# Patient Record
Sex: FEMALE | Race: WHITE | Employment: FULL TIME | URBAN - METROPOLITAN AREA
[De-identification: names, ages, dates, MRNs, and addresses within clinical notes are randomized per-mention and may not be internally consistent; named-entity substitution may affect disease eponyms.]

---

## 2017-03-31 ENCOUNTER — HOSPITAL ENCOUNTER (OUTPATIENT)
Dept: MAMMOGRAPHY | Age: 47
Discharge: HOME | End: 2017-03-31

## 2017-03-31 DIAGNOSIS — Z12.31 VISIT FOR SCREENING MAMMOGRAM: ICD-10-CM

## 2017-03-31 PROCEDURE — 77067 SCR MAMMO BI INCL CAD: CPT

## 2018-05-29 ENCOUNTER — HOSPITAL ENCOUNTER (OUTPATIENT)
Dept: MAMMOGRAPHY | Age: 48
Discharge: HOME | End: 2018-05-29
Payer: COMMERCIAL

## 2018-05-29 DIAGNOSIS — Z12.31 VISIT FOR SCREENING MAMMOGRAM: ICD-10-CM

## 2018-05-29 PROCEDURE — 77067 SCR MAMMO BI INCL CAD: CPT

## 2018-08-02 ENCOUNTER — HOSPITAL ENCOUNTER (EMERGENCY)
Age: 48
Discharge: HOME | End: 2018-08-03
Attending: EMERGENCY MEDICINE
Payer: COMMERCIAL

## 2018-08-02 ENCOUNTER — APPOINTMENT (OUTPATIENT)
Dept: CT IMAGING | Age: 48
End: 2018-08-02
Attending: EMERGENCY MEDICINE
Payer: COMMERCIAL

## 2018-08-02 VITALS
HEIGHT: 68 IN | RESPIRATION RATE: 15 BRPM | BODY MASS INDEX: 31.07 KG/M2 | WEIGHT: 205 LBS | TEMPERATURE: 98.2 F | SYSTOLIC BLOOD PRESSURE: 161 MMHG | OXYGEN SATURATION: 96 % | HEART RATE: 73 BPM | DIASTOLIC BLOOD PRESSURE: 82 MMHG

## 2018-08-02 DIAGNOSIS — G43.109 MIGRAINE WITH AURA AND WITHOUT STATUS MIGRAINOSUS, NOT INTRACTABLE: Primary | ICD-10-CM

## 2018-08-02 LAB
ALBUMIN SERPL-MCNC: 3.8 G/DL (ref 3.5–5)
ALBUMIN/GLOB SERPL: 1 {RATIO} (ref 1.1–2.2)
ALP SERPL-CCNC: 99 U/L (ref 45–117)
ALT SERPL-CCNC: 32 U/L (ref 12–78)
ANION GAP SERPL CALC-SCNC: 5 MMOL/L (ref 5–15)
AST SERPL-CCNC: 7 U/L (ref 15–37)
BASOPHILS # BLD: 0 K/UL (ref 0–0.1)
BASOPHILS NFR BLD: 0 % (ref 0–1)
BILIRUB SERPL-MCNC: 0.4 MG/DL (ref 0.2–1)
BUN SERPL-MCNC: 12 MG/DL (ref 6–20)
BUN/CREAT SERPL: 13 (ref 12–20)
CALCIUM SERPL-MCNC: 8.6 MG/DL (ref 8.5–10.1)
CHLORIDE SERPL-SCNC: 106 MMOL/L (ref 97–108)
CO2 SERPL-SCNC: 29 MMOL/L (ref 21–32)
CREAT SERPL-MCNC: 0.89 MG/DL (ref 0.55–1.02)
DIFFERENTIAL METHOD BLD: NORMAL
EOSINOPHIL # BLD: 0.2 K/UL (ref 0–0.4)
EOSINOPHIL NFR BLD: 2 % (ref 0–7)
ERYTHROCYTE [DISTWIDTH] IN BLOOD BY AUTOMATED COUNT: 12.7 % (ref 11.5–14.5)
GLOBULIN SER CALC-MCNC: 3.7 G/DL (ref 2–4)
GLUCOSE SERPL-MCNC: 105 MG/DL (ref 65–100)
HCT VFR BLD AUTO: 43.7 % (ref 35–47)
HGB BLD-MCNC: 14 G/DL (ref 11.5–16)
IMM GRANULOCYTES # BLD: 0 K/UL (ref 0–0.04)
IMM GRANULOCYTES NFR BLD AUTO: 0 % (ref 0–0.5)
LYMPHOCYTES # BLD: 2.9 K/UL (ref 0.8–3.5)
LYMPHOCYTES NFR BLD: 32 % (ref 12–49)
MCH RBC QN AUTO: 29.3 PG (ref 26–34)
MCHC RBC AUTO-ENTMCNC: 32 G/DL (ref 30–36.5)
MCV RBC AUTO: 91.4 FL (ref 80–99)
MONOCYTES # BLD: 0.5 K/UL (ref 0–1)
MONOCYTES NFR BLD: 6 % (ref 5–13)
NEUTS SEG # BLD: 5.4 K/UL (ref 1.8–8)
NEUTS SEG NFR BLD: 60 % (ref 32–75)
NRBC # BLD: 0 K/UL (ref 0–0.01)
NRBC BLD-RTO: 0 PER 100 WBC
PLATELET # BLD AUTO: 312 K/UL (ref 150–400)
PMV BLD AUTO: 10.2 FL (ref 8.9–12.9)
POTASSIUM SERPL-SCNC: 4 MMOL/L (ref 3.5–5.1)
PROT SERPL-MCNC: 7.5 G/DL (ref 6.4–8.2)
RBC # BLD AUTO: 4.78 M/UL (ref 3.8–5.2)
SODIUM SERPL-SCNC: 140 MMOL/L (ref 136–145)
WBC # BLD AUTO: 9.1 K/UL (ref 3.6–11)

## 2018-08-02 PROCEDURE — 74011250636 HC RX REV CODE- 250/636: Performed by: EMERGENCY MEDICINE

## 2018-08-02 PROCEDURE — 36415 COLL VENOUS BLD VENIPUNCTURE: CPT | Performed by: EMERGENCY MEDICINE

## 2018-08-02 PROCEDURE — 74011000250 HC RX REV CODE- 250: Performed by: EMERGENCY MEDICINE

## 2018-08-02 PROCEDURE — 85025 COMPLETE CBC W/AUTO DIFF WBC: CPT | Performed by: EMERGENCY MEDICINE

## 2018-08-02 PROCEDURE — 96375 TX/PRO/DX INJ NEW DRUG ADDON: CPT

## 2018-08-02 PROCEDURE — 70450 CT HEAD/BRAIN W/O DYE: CPT

## 2018-08-02 PROCEDURE — 99284 EMERGENCY DEPT VISIT MOD MDM: CPT

## 2018-08-02 PROCEDURE — 96374 THER/PROPH/DIAG INJ IV PUSH: CPT

## 2018-08-02 PROCEDURE — 80053 COMPREHEN METABOLIC PANEL: CPT | Performed by: EMERGENCY MEDICINE

## 2018-08-02 PROCEDURE — 93005 ELECTROCARDIOGRAM TRACING: CPT

## 2018-08-02 RX ORDER — DIPHENHYDRAMINE HYDROCHLORIDE 50 MG/ML
25 INJECTION, SOLUTION INTRAMUSCULAR; INTRAVENOUS
Status: COMPLETED | OUTPATIENT
Start: 2018-08-02 | End: 2018-08-02

## 2018-08-02 RX ORDER — METHYLPHENIDATE HYDROCHLORIDE 54 MG/1
54 TABLET ORAL AS NEEDED
COMMUNITY

## 2018-08-02 RX ADMIN — DIPHENHYDRAMINE HYDROCHLORIDE 25 MG: 50 INJECTION, SOLUTION INTRAMUSCULAR; INTRAVENOUS at 23:14

## 2018-08-02 RX ADMIN — SODIUM CHLORIDE 10 MG: 9 INJECTION INTRAMUSCULAR; INTRAVENOUS; SUBCUTANEOUS at 23:13

## 2018-08-03 LAB
ATRIAL RATE: 65 BPM
CALCULATED P AXIS, ECG09: 21 DEGREES
CALCULATED R AXIS, ECG10: 5 DEGREES
CALCULATED T AXIS, ECG11: 26 DEGREES
DIAGNOSIS, 93000: NORMAL
P-R INTERVAL, ECG05: 162 MS
Q-T INTERVAL, ECG07: 394 MS
QRS DURATION, ECG06: 72 MS
QTC CALCULATION (BEZET), ECG08: 409 MS
VENTRICULAR RATE, ECG03: 65 BPM

## 2018-08-03 NOTE — ED NOTES
MD reviewed discharge instructions and options with patient and patient verbalized understanding. RN reviewed discharge instructions using teachback method. Pt ambulated to exit without difficulty and in no signs of acute distress escorted by , and   will drive home. No complaints or needs expressed at this time. Patient was counseled on medications prescribed at discharge. VSS at time of discharge. Pt to call neurology clinic in the morning for follow up.

## 2018-08-03 NOTE — ED PROVIDER NOTES
HPI Comments: 50 y.o. female with no significant past medical history who presents via private vehicle with chief complaint of headache. Pt c/o worsening HA for the past 3 days, accompanied by nausea and vomiting yesterday and photophobia onset today. Pt reports that the HA has been diffuse, but also reports a \"differernt sensation\" on the L side of her face onset today. Pain is a 7.5/10. Pt took motrin and sudafed without significant relief of sx. Pt denies injury or fall. Pt reports that she woke up with the HA 3 days ago. Pt denies visual disturbance, numbness, weakness. Pt also reports one episode of diarrhea 3 days ago, but it has since resolved. There are no other acute medical concerns at this time. Note written by Nate Spence, as dictated by Jemal Green DO 10:25 PM 
 
 
The history is provided by the patient. No  was used. History reviewed. No pertinent past medical history. History reviewed. No pertinent surgical history. History reviewed. No pertinent family history. Social History Social History  Marital status: UNKNOWN Spouse name: N/A  
 Number of children: N/A  
 Years of education: N/A Occupational History  Not on file. Social History Main Topics  Smoking status: Not on file  Smokeless tobacco: Not on file  Alcohol use Not on file  Drug use: Not on file  Sexual activity: Not on file Other Topics Concern  Not on file Social History Narrative ALLERGIES: Review of patient's allergies indicates no known allergies. Review of Systems Constitutional: Negative for activity change, appetite change, chills and fever. HENT: Negative for congestion, rhinorrhea, sinus pressure, sneezing and sore throat. Eyes: Positive for photophobia. Negative for visual disturbance. Respiratory: Negative for cough and shortness of breath. Cardiovascular: Negative for chest pain. Gastrointestinal: Positive for diarrhea (resolved), nausea and vomiting. Negative for abdominal pain, blood in stool and constipation. Genitourinary: Negative for difficulty urinating, dysuria, flank pain, frequency, hematuria, menstrual problem, urgency, vaginal bleeding and vaginal discharge. Musculoskeletal: Negative for arthralgias, back pain, myalgias and neck pain. Skin: Negative for rash and wound. Neurological: Positive for headaches. Negative for syncope, weakness and numbness. Psychiatric/Behavioral: Negative for self-injury and suicidal ideas. All other systems reviewed and are negative. Vitals:  
 08/02/18 2214 BP: 161/82 Pulse: 73 Resp: 15 Temp: 98.2 °F (36.8 °C) SpO2: 96% Weight: 93 kg (205 lb) Height: 5' 7.5\" (1.715 m) Physical Exam  
Constitutional: She is oriented to person, place, and time. She appears well-developed and well-nourished. No distress. Uncomfortable appearing. HENT:  
Head: Normocephalic and atraumatic. Eyes: Conjunctivae and EOM are normal. Pupils are equal, round, and reactive to light. No scleral icterus. Neck: Normal range of motion. Neck supple. No rigidity. No tracheal deviation present. Cardiovascular: Normal rate, regular rhythm, normal heart sounds and intact distal pulses. Exam reveals no gallop and no friction rub. No murmur heard. Pulmonary/Chest: Effort normal and breath sounds normal. She has no wheezes. She has no rales. Abdominal: Soft. She exhibits no distension. There is no tenderness. There is no rebound and no guarding. Musculoskeletal: She exhibits no edema. Neurological: She is alert and oriented to person, place, and time. She has normal strength. No cranial nerve deficit or sensory deficit. She displays a negative Romberg sign. Coordination and gait normal.  
Slight tingling sensation noted subjectively to L side of face. No dysarthria. No facial droop. 5/5 strength in all 4 extremities.    
Skin: Skin is warm and dry. No rash noted. She is not diaphoretic. Psychiatric: She has a normal mood and affect. Nursing note and vitals reviewed. Note written by Nate Baron, as dictated by Rudi Donis,  10:53 PM 
 
 
 
MDM 
50 y.o. female with remote hx of migraines presents with left sided HA with photophobia, N, V. Notes slight tingling sensation to her left face, otherwise neuro intact. Labs were drawn and returned reassuringly with no significant abnormalities. Ct head was done and was negative for any acute abnormalities. She was given a migraine cocktail and had significant improvement in her sx. Facial tingling resolved. Plan to d/c home. Given hx of migraines she was rec'd to f/u with neurology for further evaluation of her sx, especially if they persist or return more frequently. Return precautions were given for worsening or concerns. Discussed with pt and her  at the bedside and they stated both understanding and agreement. ED Course Procedures

## 2018-08-03 NOTE — DISCHARGE INSTRUCTIONS
Migraine Headache: Care Instructions  Your Care Instructions  Migraines are painful, throbbing headaches that often start on one side of the head. They may cause nausea and vomiting and make you sensitive to light, sound, or smell. Without treatment, migraines can last from 4 hours to a few days. Medicines can help prevent migraines or stop them after they have started. Your doctor can help you find which ones work best for you. Follow-up care is a key part of your treatment and safety. Be sure to make and go to all appointments, and call your doctor if you are having problems. It's also a good idea to know your test results and keep a list of the medicines you take. How can you care for yourself at home? · Do not drive if you have taken a prescription pain medicine. · Rest in a quiet, dark room until your headache is gone. Close your eyes, and try to relax or go to sleep. Don't watch TV or read. · Put a cold, moist cloth or cold pack on the painful area for 10 to 20 minutes at a time. Put a thin cloth between the cold pack and your skin. · Use a warm, moist towel or a heating pad set on low to relax tight shoulder and neck muscles. · Have someone gently massage your neck and shoulders. · Take your medicines exactly as prescribed. Call your doctor if you think you are having a problem with your medicine. You will get more details on the specific medicines your doctor prescribes. · Be careful not to take pain medicine more often than the instructions allow. You could get worse or more frequent headaches when the medicine wears off. To prevent migraines  · Keep a headache diary so you can figure out what triggers your headaches. Avoiding triggers may help you prevent headaches. Record when each headache began, how long it lasted, and what the pain was like.  (Was it throbbing, aching, stabbing, or dull?) Write down any other symptoms you had with the headache, such as nausea, flashing lights or dark spots, or sensitivity to bright light or loud noise. Note if the headache occurred near your period. List anything that might have triggered the headache. Triggers may include certain foods (chocolate, cheese, wine) or odors, smoke, bright light, stress, or lack of sleep. · If your doctor has prescribed medicine for your migraines, take it as directed. You may have medicine that you take only when you get a migraine and medicine that you take all the time to help prevent migraines. ¨ If your doctor has prescribed medicine for when you get a headache, take it at the first sign of a migraine, unless your doctor has given you other instructions. ¨ If your doctor has prescribed medicine to prevent migraines, take it exactly as prescribed. Call your doctor if you think you are having a problem with your medicine. · Find healthy ways to deal with stress. Migraines are most common during or right after stressful times. Take time to relax before and after you do something that has caused a migraine in the past.  · Try to keep your muscles relaxed by keeping good posture. Check your jaw, face, neck, and shoulder muscles for tension. Try to relax them. When you sit at a desk, change positions often. And make sure to stretch for 30 seconds each hour. · Get plenty of sleep and exercise. · Eat meals on a regular schedule. Avoid foods and drinks that often trigger migraines. These include chocolate, alcohol (especially red wine and port), aspartame, monosodium glutamate (MSG), and some additives found in foods (such as hot dogs, patterson, cold cuts, aged cheeses, and pickled foods). · Limit caffeine. Don't drink too much coffee, tea, or soda. But don't quit caffeine suddenly. That can also give you migraines. · Do not smoke or allow others to smoke around you. If you need help quitting, talk to your doctor about stop-smoking programs and medicines. These can increase your chances of quitting for good.   · If you are taking birth control pills or hormone therapy, talk to your doctor about whether they are triggering your migraines. When should you call for help? Call 911 anytime you think you may need emergency care. For example, call if:    · You have signs of a stroke. These may include:  ¨ Sudden numbness, paralysis, or weakness in your face, arm, or leg, especially on only one side of your body. ¨ Sudden vision changes. ¨ Sudden trouble speaking. ¨ Sudden confusion or trouble understanding simple statements. ¨ Sudden problems with walking or balance. ¨ A sudden, severe headache that is different from past headaches.    Call your doctor now or seek immediate medical care if:    · You have new or worse nausea and vomiting.     · You have a new or higher fever.     · Your headache gets much worse.    Watch closely for changes in your health, and be sure to contact your doctor if:    · You are not getting better after 2 days (48 hours). Where can you learn more? Go to http://ahsan-paola.info/. Enter I436 in the search box to learn more about \"Migraine Headache: Care Instructions. \"  Current as of: October 9, 2017  Content Version: 11.7  © 8000-7673 [employer]. Care instructions adapted under license by [employer] (which disclaims liability or warranty for this information). If you have questions about a medical condition or this instruction, always ask your healthcare professional. [address] any warranty or liability for your use of this information.

## 2018-08-03 NOTE — ED TRIAGE NOTES
Pt to ED for headache x 3 days. Pt states she's had headache in anterior head and posterior neck. +N/V. +lightheaded. +photophobia. States pain has evolved in locations and sensation over the past few days.

## 2018-11-28 ENCOUNTER — APPOINTMENT (OUTPATIENT)
Dept: CT IMAGING | Age: 48
End: 2018-11-28
Attending: EMERGENCY MEDICINE
Payer: COMMERCIAL

## 2018-11-28 ENCOUNTER — HOSPITAL ENCOUNTER (EMERGENCY)
Age: 48
Discharge: HOME | End: 2018-11-28
Attending: EMERGENCY MEDICINE
Payer: COMMERCIAL

## 2018-11-28 VITALS
OXYGEN SATURATION: 98 % | HEIGHT: 68 IN | SYSTOLIC BLOOD PRESSURE: 137 MMHG | TEMPERATURE: 98.4 F | RESPIRATION RATE: 16 BRPM | DIASTOLIC BLOOD PRESSURE: 92 MMHG | HEART RATE: 88 BPM | WEIGHT: 194 LBS | BODY MASS INDEX: 29.4 KG/M2

## 2018-11-28 DIAGNOSIS — M54.6 MIDLINE THORACIC BACK PAIN, UNSPECIFIED CHRONICITY: Primary | ICD-10-CM

## 2018-11-28 LAB
ANION GAP SERPL CALC-SCNC: 11 MMOL/L (ref 5–15)
BUN SERPL-MCNC: 19 MG/DL (ref 6–20)
BUN/CREAT SERPL: 19 (ref 12–20)
CALCIUM SERPL-MCNC: 9.1 MG/DL (ref 8.5–10.1)
CHLORIDE SERPL-SCNC: 100 MMOL/L (ref 97–108)
CO2 SERPL-SCNC: 27 MMOL/L (ref 21–32)
COMMENT, HOLDF: NORMAL
CREAT SERPL-MCNC: 0.99 MG/DL (ref 0.55–1.02)
D DIMER PPP FEU-MCNC: 0.41 MG/L FEU (ref 0–0.65)
GLUCOSE SERPL-MCNC: 95 MG/DL (ref 65–100)
POTASSIUM SERPL-SCNC: 3.5 MMOL/L (ref 3.5–5.1)
SAMPLES BEING HELD,HOLD: NORMAL
SODIUM SERPL-SCNC: 138 MMOL/L (ref 136–145)

## 2018-11-28 PROCEDURE — 85379 FIBRIN DEGRADATION QUANT: CPT

## 2018-11-28 PROCEDURE — 80048 BASIC METABOLIC PNL TOTAL CA: CPT

## 2018-11-28 PROCEDURE — 99282 EMERGENCY DEPT VISIT SF MDM: CPT

## 2018-11-28 PROCEDURE — 36415 COLL VENOUS BLD VENIPUNCTURE: CPT

## 2018-11-28 RX ORDER — SODIUM CHLORIDE 9 MG/ML
50 INJECTION, SOLUTION INTRAVENOUS ONCE
Status: DISCONTINUED | OUTPATIENT
Start: 2018-11-28 | End: 2018-11-28

## 2018-11-28 RX ORDER — SODIUM CHLORIDE 0.9 % (FLUSH) 0.9 %
10 SYRINGE (ML) INJECTION ONCE
Status: DISCONTINUED | OUTPATIENT
Start: 2018-11-28 | End: 2018-11-28

## 2018-11-28 RX ORDER — METHOCARBAMOL 750 MG/1
750 TABLET, FILM COATED ORAL 3 TIMES DAILY
Qty: 30 TAB | Refills: 0 | Status: SHIPPED | OUTPATIENT
Start: 2018-11-28

## 2018-11-28 NOTE — DISCHARGE INSTRUCTIONS
Back Pain: Care Instructions  Your Care Instructions    Back pain has many possible causes. It is often related to problems with muscles and ligaments of the back. It may also be related to problems with the nerves, discs, or bones of the back. Moving, lifting, standing, sitting, or sleeping in an awkward way can strain the back. Sometimes you don't notice the injury until later. Arthritis is another common cause of back pain. Although it may hurt a lot, back pain usually improves on its own within several weeks. Most people recover in 12 weeks or less. Using good home treatment and being careful not to stress your back can help you feel better sooner. Follow-up care is a key part of your treatment and safety. Be sure to make and go to all appointments, and call your doctor if you are having problems. It's also a good idea to know your test results and keep a list of the medicines you take. How can you care for yourself at home? · Sit or lie in positions that are most comfortable and reduce your pain. Try one of these positions when you lie down:  ? Lie on your back with your knees bent and supported by large pillows. ? Lie on the floor with your legs on the seat of a sofa or chair. ? Lie on your side with your knees and hips bent and a pillow between your legs. ? Lie on your stomach if it does not make pain worse. · Do not sit up in bed, and avoid soft couches and twisted positions. Bed rest can help relieve pain at first, but it delays healing. Avoid bed rest after the first day of back pain. · Change positions every 30 minutes. If you must sit for long periods of time, take breaks from sitting. Get up and walk around, or lie in a comfortable position. · Try using a heating pad on a low or medium setting for 15 to 20 minutes every 2 or 3 hours. Try a warm shower in place of one session with the heating pad. · You can also try an ice pack for 10 to 15 minutes every 2 to 3 hours.  Put a thin cloth between the ice pack and your skin. · Take pain medicines exactly as directed. ? If the doctor gave you a prescription medicine for pain, take it as prescribed. ? If you are not taking a prescription pain medicine, ask your doctor if you can take an over-the-counter medicine. · Take short walks several times a day. You can start with 5 to 10 minutes, 3 or 4 times a day, and work up to longer walks. Walk on level surfaces and avoid hills and stairs until your back is better. · Return to work and other activities as soon as you can. Continued rest without activity is usually not good for your back. · To prevent future back pain, do exercises to stretch and strengthen your back and stomach. Learn how to use good posture, safe lifting techniques, and proper body mechanics. When should you call for help? Call your doctor now or seek immediate medical care if:    · You have new or worsening numbness in your legs.     · You have new or worsening weakness in your legs. (This could make it hard to stand up.)     · You lose control of your bladder or bowels.    Watch closely for changes in your health, and be sure to contact your doctor if:    · You have a fever, lose weight, or don't feel well.     · You do not get better as expected. Where can you learn more? Go to http://ahsan-paola.info/. Enter D445 in the search box to learn more about \"Back Pain: Care Instructions. \"  Current as of: November 29, 2017  Content Version: 11.8  © 6389-2176 [employer]. Care instructions adapted under license by [employer] (which disclaims liability or warranty for this information). If you have questions about a medical condition or this instruction, always ask your healthcare professional. [place] [address] any warranty or liability for your use of this information.

## 2018-11-28 NOTE — ED TRIAGE NOTES
Triage Note: Patient arrives to ER complaining of neck pain that started approximately 1.5 weeks ago that radiates to her mid back and since 5 days ago radiates to her chest. Pt seen at [facility] and told to come to the ER for r/o blood clot d/t family hx of factor 5 deficiency.

## 2018-11-28 NOTE — ED PROVIDER NOTES
The history is provided by the patient. Neck Pain This is a new problem. The current episode started more than 1 week ago. The problem has been gradually worsening. The pain is associated with nothing. There has been no fever. Associated symptoms include chest pain. patient with neck and back pain (between shoulder blades) now with patient radiating to the mid-chest.  Sent from [facility] to r/o blood clot in her lung History reviewed. No pertinent past medical history. Past Surgical History:  
Procedure Laterality Date  HX CHOLECYSTECTOMY    
 2011 History reviewed. No pertinent family history. Social History Socioeconomic History  Marital status:  Spouse name: Not on file  Number of children: Not on file  Years of education: Not on file  Highest education level: Not on file Social Needs  Financial resource strain: Not on file  Food insecurity - worry: Not on file  Food insecurity - inability: Not on file  Transportation needs - medical: Not on file  Transportation needs - non-medical: Not on file Occupational History  Not on file Tobacco Use  Smoking status: Never Smoker  Smokeless tobacco: Never Used Substance and Sexual Activity  Alcohol use: Yes Comment: rarely  Drug use: No  
 Sexual activity: Not on file Other Topics Concern  Not on file Social History Narrative  Not on file ALLERGIES: Patient has no known allergies. Review of Systems Constitutional: Negative for chills and fever. Respiratory: Negative for chest tightness and shortness of breath. Cardiovascular: Positive for chest pain. Negative for palpitations and leg swelling. Gastrointestinal: Negative for abdominal pain, nausea and vomiting. Musculoskeletal: Positive for back pain and neck pain. All other systems reviewed and are negative. Vitals:  
 11/28/18 1627 BP: 147/89 Pulse: 86 Resp: 16  
 Temp: 99 °F (37.2 °C) SpO2: 98% Weight: 88 kg (194 lb 0.1 oz) Height: 5' 7.5\" (1.715 m) Physical Exam  
Constitutional: She is oriented to person, place, and time. She appears well-developed and well-nourished. HENT:  
Head: Normocephalic and atraumatic. Cardiovascular: Normal rate, regular rhythm and normal heart sounds. No murmur heard. Pulmonary/Chest: Effort normal and breath sounds normal. No respiratory distress. Musculoskeletal: She exhibits tenderness. She exhibits no edema or deformity. Paraspinal area between shoulders Neurological: She is alert and oriented to person, place, and time. Psychiatric: She has a normal mood and affect. Nursing note and vitals reviewed. MDM Number of Diagnoses or Management Options Midline thoracic back pain, unspecified chronicity:  
Diagnosis management comments: Patient with likely MSK pain. Patient with hx of factor 5 deficiency and sister with same and has had blood clots in her lungs. [facility] and sent her for CTA chest. 
EKG from [facility] - normal 
 
1720 - patient requesting d-dimer and if elevated will do the CTA chest, she has had a lot of imaging recently and if she can avoid it that would be her preference. 1750 - D-dimer negative, will d/c home with robaxin and refer to PCP for PT referral 
 
  
Amount and/or Complexity of Data Reviewed Clinical lab tests: ordered and reviewed Tests in the radiology section of CPT®: ordered and reviewed Procedures VITALS:  
Patient Vitals for the past 8 hrs: 
 Temp Pulse Resp BP SpO2  
11/28/18 1627 99 °F (37.2 °C) 86 16 147/89 98 % Recent Results (from the past 24 hour(s)) SAMPLES BEING HELD Collection Time: 11/28/18  4:29 PM  
Result Value Ref Range SAMPLES BEING HELD 1LAV,1PST,1BLU,1RED COMMENT   Add-on orders for these samples will be processed based on acceptable specimen integrity and analyte stability, which may vary by analyte. METABOLIC PANEL, BASIC Collection Time: 11/28/18  4:29 PM  
Result Value Ref Range Sodium 138 136 - 145 mmol/L Potassium 3.5 3.5 - 5.1 mmol/L Chloride 100 97 - 108 mmol/L  
 CO2 27 21 - 32 mmol/L Anion gap 11 5 - 15 mmol/L Glucose 95 65 - 100 mg/dL BUN 19 6 - 20 MG/DL Creatinine 0.99 0.55 - 1.02 MG/DL  
 BUN/Creatinine ratio 19 12 - 20 GFR est AA >60 >60 ml/min/1.73m2 GFR est non-AA 60 (L) >60 ml/min/1.73m2 Calcium 9.1 8.5 - 10.1 MG/DL  
D DIMER Collection Time: 11/28/18  4:29 PM  
Result Value Ref Range D-dimer 0.41 0.00 - 0.65 mg/L FEU No results found.

## 2020-12-11 ENCOUNTER — TRANSCRIBE ORDER (OUTPATIENT)
Dept: SCHEDULING | Age: 50
End: 2020-12-11

## 2020-12-11 DIAGNOSIS — Z12.31 OTHER SCREENING MAMMOGRAM: Primary | ICD-10-CM

## 2021-01-18 ENCOUNTER — HOSPITAL ENCOUNTER (OUTPATIENT)
Dept: MAMMOGRAPHY | Age: 51
Discharge: HOME | End: 2021-01-18
Payer: COMMERCIAL

## 2021-01-18 DIAGNOSIS — Z12.31 OTHER SCREENING MAMMOGRAM: ICD-10-CM

## 2021-01-18 PROCEDURE — 77067 SCR MAMMO BI INCL CAD: CPT | Performed by: INTERNAL MEDICINE

## 2021-01-18 PROCEDURE — 77030040922 HC BLNKT HYPOTHRM STRY -A

## 2021-01-18 PROCEDURE — 77030040361 HC SLV COMPR DVT MDII -B

## 2021-12-07 ENCOUNTER — HOSPITAL ENCOUNTER (INPATIENT)
Age: 51
LOS: 2 days | Discharge: HOME | DRG: 177 | End: 2021-12-10
Attending: EMERGENCY MEDICINE | Admitting: STUDENT IN AN ORGANIZED HEALTH CARE EDUCATION/TRAINING PROGRAM
Payer: COMMERCIAL

## 2021-12-07 DIAGNOSIS — J96.01 ACUTE RESPIRATORY FAILURE WITH HYPOXEMIA (HCC): ICD-10-CM

## 2021-12-07 DIAGNOSIS — U07.1 PNEUMONIA DUE TO COVID-19 VIRUS: Primary | ICD-10-CM

## 2021-12-07 DIAGNOSIS — J12.82 PNEUMONIA DUE TO COVID-19 VIRUS: Primary | ICD-10-CM

## 2021-12-07 LAB
ALBUMIN SERPL-MCNC: 2.9 G/DL (ref 3.5–5)
ALBUMIN/GLOB SERPL: 0.7 {RATIO} (ref 1.1–2.2)
ALP SERPL-CCNC: 74 U/L (ref 45–117)
ALT SERPL-CCNC: 83 U/L (ref 12–78)
ANION GAP SERPL CALC-SCNC: 8 MMOL/L (ref 5–15)
AST SERPL-CCNC: 73 U/L (ref 15–37)
BASOPHILS # BLD: 0 K/UL (ref 0–0.1)
BASOPHILS NFR BLD: 0 % (ref 0–1)
BILIRUB SERPL-MCNC: 0.5 MG/DL (ref 0.2–1)
BUN SERPL-MCNC: 8 MG/DL (ref 6–20)
BUN/CREAT SERPL: 10 (ref 12–20)
CALCIUM SERPL-MCNC: 8.6 MG/DL (ref 8.5–10.1)
CHLORIDE SERPL-SCNC: 103 MMOL/L (ref 97–108)
CO2 SERPL-SCNC: 26 MMOL/L (ref 21–32)
CREAT SERPL-MCNC: 0.77 MG/DL (ref 0.55–1.02)
DIFFERENTIAL METHOD BLD: ABNORMAL
EOSINOPHIL # BLD: 0 K/UL (ref 0–0.4)
EOSINOPHIL NFR BLD: 0 % (ref 0–7)
ERYTHROCYTE [DISTWIDTH] IN BLOOD BY AUTOMATED COUNT: 12.9 % (ref 11.5–14.5)
GLOBULIN SER CALC-MCNC: 4.3 G/DL (ref 2–4)
GLUCOSE SERPL-MCNC: 102 MG/DL (ref 65–100)
HCT VFR BLD AUTO: 36.9 % (ref 35–47)
HGB BLD-MCNC: 12.1 G/DL (ref 11.5–16)
IMM GRANULOCYTES # BLD AUTO: 0 K/UL (ref 0–0.04)
IMM GRANULOCYTES NFR BLD AUTO: 1 % (ref 0–0.5)
LYMPHOCYTES # BLD: 1.4 K/UL (ref 0.8–3.5)
LYMPHOCYTES NFR BLD: 26 % (ref 12–49)
MCH RBC QN AUTO: 29.3 PG (ref 26–34)
MCHC RBC AUTO-ENTMCNC: 32.8 G/DL (ref 30–36.5)
MCV RBC AUTO: 89.3 FL (ref 80–99)
MONOCYTES # BLD: 0.3 K/UL (ref 0–1)
MONOCYTES NFR BLD: 6 % (ref 5–13)
NEUTS SEG # BLD: 3.7 K/UL (ref 1.8–8)
NEUTS SEG NFR BLD: 67 % (ref 32–75)
NRBC # BLD: 0 K/UL (ref 0–0.01)
NRBC BLD-RTO: 0 PER 100 WBC
PLATELET # BLD AUTO: 311 K/UL (ref 150–400)
PMV BLD AUTO: 9.3 FL (ref 8.9–12.9)
POTASSIUM SERPL-SCNC: 3.7 MMOL/L (ref 3.5–5.1)
PROT SERPL-MCNC: 7.2 G/DL (ref 6.4–8.2)
RBC # BLD AUTO: 4.13 M/UL (ref 3.8–5.2)
SODIUM SERPL-SCNC: 137 MMOL/L (ref 136–145)
WBC # BLD AUTO: 5.5 K/UL (ref 3.6–11)

## 2021-12-07 PROCEDURE — 83880 ASSAY OF NATRIURETIC PEPTIDE: CPT

## 2021-12-07 PROCEDURE — 36415 COLL VENOUS BLD VENIPUNCTURE: CPT

## 2021-12-07 PROCEDURE — 85610 PROTHROMBIN TIME: CPT

## 2021-12-07 PROCEDURE — 74011250636 HC RX REV CODE- 250/636: Performed by: EMERGENCY MEDICINE

## 2021-12-07 PROCEDURE — 84145 PROCALCITONIN (PCT): CPT

## 2021-12-07 PROCEDURE — 84484 ASSAY OF TROPONIN QUANT: CPT

## 2021-12-07 PROCEDURE — 85730 THROMBOPLASTIN TIME PARTIAL: CPT

## 2021-12-07 PROCEDURE — 85025 COMPLETE CBC W/AUTO DIFF WBC: CPT

## 2021-12-07 PROCEDURE — 83735 ASSAY OF MAGNESIUM: CPT

## 2021-12-07 PROCEDURE — 93005 ELECTROCARDIOGRAM TRACING: CPT

## 2021-12-07 PROCEDURE — 99284 EMERGENCY DEPT VISIT MOD MDM: CPT

## 2021-12-07 PROCEDURE — 85379 FIBRIN DEGRADATION QUANT: CPT

## 2021-12-07 PROCEDURE — 85384 FIBRINOGEN ACTIVITY: CPT

## 2021-12-07 PROCEDURE — 80053 COMPREHEN METABOLIC PANEL: CPT

## 2021-12-07 PROCEDURE — 86140 C-REACTIVE PROTEIN: CPT

## 2021-12-07 PROCEDURE — 83615 LACTATE (LD) (LDH) ENZYME: CPT

## 2021-12-07 PROCEDURE — 82728 ASSAY OF FERRITIN: CPT

## 2021-12-07 RX ORDER — DEXAMETHASONE 4 MG/1
10 TABLET ORAL
Status: COMPLETED | OUTPATIENT
Start: 2021-12-07 | End: 2021-12-07

## 2021-12-07 RX ADMIN — DEXAMETHASONE 10 MG: 4 TABLET ORAL at 22:55

## 2021-12-08 ENCOUNTER — APPOINTMENT (OUTPATIENT)
Dept: GENERAL RADIOLOGY | Age: 51
DRG: 177 | End: 2021-12-08
Attending: EMERGENCY MEDICINE
Payer: COMMERCIAL

## 2021-12-08 PROBLEM — U07.1 COVID-19: Status: ACTIVE | Noted: 2021-12-08

## 2021-12-08 LAB
APTT PPP: 25.6 SEC (ref 22.1–31)
ATRIAL RATE: 79 BPM
BNP SERPL-MCNC: 140 PG/ML
CALCULATED P AXIS, ECG09: 43 DEGREES
CALCULATED R AXIS, ECG10: 35 DEGREES
CALCULATED T AXIS, ECG11: 12 DEGREES
COMMENT, HOLDF: NORMAL
CRP SERPL-MCNC: 6.08 MG/DL (ref 0–0.6)
D DIMER PPP FEU-MCNC: 1.62 MG/L FEU (ref 0–0.65)
DIAGNOSIS, 93000: NORMAL
FERRITIN SERPL-MCNC: 1015 NG/ML (ref 26–388)
FIBRINOGEN PPP-MCNC: 471 MG/DL (ref 200–475)
INR PPP: 0.9 (ref 0.9–1.1)
LACTATE SERPL-SCNC: 1.7 MMOL/L (ref 0.4–2)
LDH SERPL L TO P-CCNC: 679 U/L (ref 81–246)
MAGNESIUM SERPL-MCNC: 2.5 MG/DL (ref 1.6–2.4)
P-R INTERVAL, ECG05: 146 MS
PROCALCITONIN SERPL-MCNC: <0.05 NG/ML
PROTHROMBIN TIME: 9.8 SEC (ref 9–11.1)
Q-T INTERVAL, ECG07: 360 MS
QRS DURATION, ECG06: 70 MS
QTC CALCULATION (BEZET), ECG08: 412 MS
SAMPLES BEING HELD,HOLD: NORMAL
THERAPEUTIC RANGE,PTTT: NORMAL SECS (ref 58–77)
TROPONIN-HIGH SENSITIVITY: 8 NG/L (ref 0–51)
VENTRICULAR RATE, ECG03: 79 BPM

## 2021-12-08 PROCEDURE — G0378 HOSPITAL OBSERVATION PER HR: HCPCS

## 2021-12-08 PROCEDURE — 71045 X-RAY EXAM CHEST 1 VIEW: CPT

## 2021-12-08 PROCEDURE — 96374 THER/PROPH/DIAG INJ IV PUSH: CPT

## 2021-12-08 PROCEDURE — 96372 THER/PROPH/DIAG INJ SC/IM: CPT

## 2021-12-08 PROCEDURE — 65660000000 HC RM CCU STEPDOWN

## 2021-12-08 PROCEDURE — 83605 ASSAY OF LACTIC ACID: CPT

## 2021-12-08 PROCEDURE — 74011250637 HC RX REV CODE- 250/637: Performed by: HOSPITALIST

## 2021-12-08 PROCEDURE — 74011250636 HC RX REV CODE- 250/636: Performed by: HOSPITALIST

## 2021-12-08 RX ORDER — ZINC SULFATE 50(220)MG
1 CAPSULE ORAL
Status: DISCONTINUED | OUTPATIENT
Start: 2021-12-08 | End: 2021-12-10 | Stop reason: HOSPADM

## 2021-12-08 RX ORDER — DEXAMETHASONE SODIUM PHOSPHATE 4 MG/ML
6 INJECTION, SOLUTION INTRA-ARTICULAR; INTRALESIONAL; INTRAMUSCULAR; INTRAVENOUS; SOFT TISSUE EVERY 24 HOURS
Status: DISCONTINUED | OUTPATIENT
Start: 2021-12-08 | End: 2021-12-10 | Stop reason: HOSPADM

## 2021-12-08 RX ORDER — ENOXAPARIN SODIUM 100 MG/ML
40 INJECTION SUBCUTANEOUS EVERY 12 HOURS
Status: DISCONTINUED | OUTPATIENT
Start: 2021-12-08 | End: 2021-12-10 | Stop reason: HOSPADM

## 2021-12-08 RX ORDER — DOXYCYCLINE HYCLATE 100 MG
100 TABLET ORAL EVERY 12 HOURS
Status: DISCONTINUED | OUTPATIENT
Start: 2021-12-08 | End: 2021-12-09

## 2021-12-08 RX ORDER — ASCORBIC ACID 500 MG
500 TABLET ORAL 2 TIMES DAILY
Status: DISCONTINUED | OUTPATIENT
Start: 2021-12-08 | End: 2021-12-10 | Stop reason: HOSPADM

## 2021-12-08 RX ADMIN — OXYCODONE HYDROCHLORIDE AND ACETAMINOPHEN 500 MG: 500 TABLET ORAL at 18:07

## 2021-12-08 RX ADMIN — ENOXAPARIN SODIUM 40 MG: 100 INJECTION SUBCUTANEOUS at 18:07

## 2021-12-08 RX ADMIN — ZINC SULFATE 220 MG (50 MG) CAPSULE 1 CAPSULE: CAPSULE at 12:19

## 2021-12-08 RX ADMIN — DOXYCYCLINE HYCLATE 100 MG: 100 TABLET, COATED ORAL at 20:35

## 2021-12-08 RX ADMIN — DOXYCYCLINE HYCLATE 100 MG: 100 TABLET, COATED ORAL at 08:23

## 2021-12-08 RX ADMIN — DEXAMETHASONE SODIUM PHOSPHATE 6 MG: 4 INJECTION, SOLUTION INTRAMUSCULAR; INTRAVENOUS at 12:19

## 2021-12-08 RX ADMIN — ENOXAPARIN SODIUM 40 MG: 100 INJECTION SUBCUTANEOUS at 05:05

## 2021-12-08 RX ADMIN — OXYCODONE HYDROCHLORIDE AND ACETAMINOPHEN 500 MG: 500 TABLET ORAL at 08:23

## 2021-12-08 NOTE — PROGRESS NOTES
[id number] Adult  Hospitalist Group                                                                                          Hospitalist Progress Note  Tex Valladares MD  Answering service: [phone] from in house phone        Date of Service:  2021  NAME:  Michael Pizarro  :  1970  MRN:  [id number]      Admission Summary:   Michael Pizarro is a 46 y.o. female with past medical history of ADHD came to the hospital with a chief complaint of shortness of breath and low oxygen saturation.     Patient states that she was having symptoms of fever, chills body aches, shortness of breath, cough for about 12 days. Patient states that initially she tested negative but then was tested positive last week. Patient states that her  and children also had COVID-19. Patient states that she did not seen. She was seen at [facility] first yesterday where she was prescribed doxycycline. She was told to monitor her oxygen saturations-while at home she noted that oxygen saturation was less than 90%, she came to the emergency room for further evaluation.     In the emergency room, she received Decadron. Chest x-ray showed right lower lobe infiltrate    Interval history / Subjective:   Patient seen and examined on 3 L nasal cannula  No breathing difficulty     Assessment & Plan:     #Acute hypoxemic respiratory failure  #COVID-19 pneumonia  -Oxygen saturation on room air 89%, she was placed on 2 L oxygen.  -Start Decadron. Continue sliding scale insulin  -She states that she has symptoms for 12 days now-probably out of window for benefit of remdesivir. Can consider based on further clinical course. -She qualifies for barcitunib as CRP less than 7, I have provided information for the patient-start the medication if patient agrees.  -Procalcitonin within normal limits.   Patient said she took 2 doses of doxycycline since yesterday.  -Lovenox per protocol       #Mild AST/ALT elevation due to COVID-19. Trend CMP daily     #Morbid obesity     #ADHD-patient states that she takes Concerta as needed.      Code status: Full  DVT prophylaxis: Lovenox    Care Plan discussed with: Patient/Family and Nurse  Anticipated Disposition: Home w/Family  Anticipated Discharge: 24 hours to 48 hours     Hospital Problems  Never Reviewed          Codes Class Noted POA    COVID-19 ICD-10-CM: U07.1  ICD-9-CM: 079.89  12/8/2021 Unknown                Review of Systems:   Pertinent items are noted in HPI. Vital Signs:    Last 24hrs VS reviewed since prior progress note. Most recent are:  Visit Vitals  /81 (BP 1 Location: Left upper arm, BP Patient Position: Sitting)   Pulse 74   Temp 98.5 °F (36.9 °C)   Resp 17   Ht 5' 7\" (1.702 m)   Wt 103 kg (227 lb 1.2 oz)   SpO2 95%   BMI 35.56 kg/m²       No intake or output data in the 24 hours ending 12/08/21 1410     Physical Examination:     I had a face to face encounter with this patient and independently examined them on 12/8/2021 as outlined below:          Constitutional:  No acute distress, cooperative, pleasant    ENT:  Oral mucosa moist, oropharynx benign. Resp:  CTA bilaterally. No wheezing/rhonchi/rales. No accessory muscle use   CV:  Regular rhythm, normal rate, no murmurs, gallops, rubs    GI:  Soft, non distended, non tender. normoactive bowel sounds, no hepatosplenomegaly     Musculoskeletal:  No edema, warm, 2+ pulses throughout    Neurologic:  Moves all extremities.   AAOx3, CN II-XII reviewed            Data Review:    I personally reviewed  Image and labs      Labs:     Recent Labs     12/07/21 2032   WBC 5.5   HGB 12.1   HCT 36.9        Recent Labs     12/07/21 2032      K 3.7      CO2 26   BUN 8   CREA 0.77   *   CA 8.6   MG 2.5*     Recent Labs     12/07/21 2032   ALT 83*   AP 74   TBILI 0.5   TP 7.2   ALB 2.9*   GLOB 4.3*     Recent Labs     12/07/21 2032   INR 0.9   PTP 9.8   APTT 25.6      Recent Labs 12/07/21 2032   FERR 1,015*      No results found for: FOL, RBCF   No results for input(s): PH, PCO2, PO2 in the last 72 hours. No results for input(s): CPK, CKNDX, TROIQ in the last 72 hours. No lab exists for component: CPKMB  No results found for: CHOL, CHOLX, CHLST, CHOLV, HDL, HDLP, LDL, LDLC, DLDLP, TGLX, TRIGL, TRIGP, CHHD, CHHDX  No results found for: GLUCPOC  No results found for: COLOR, APPRN, SPGRU, REFSG, KOMAL, PROTU, GLUCU, KETU, BILU, UROU, SHAR, LEUKU, GLUKE, EPSU, BACTU, WBCU, RBCU, CASTS, UCRY      Medications Reviewed:     Current Facility-Administered Medications   Medication Dose Route Frequency    dexamethasone (DECADRON) 4 mg/mL injection 6 mg  6 mg IntraVENous Q24H    enoxaparin (LOVENOX) injection 40 mg  40 mg SubCUTAneous Q12H    . PHARMACY TO SUBSTITUTE PER PROTOCOL (Reordered from: methylphenidate ER 54 mg 24 hr tab)    Per Protocol    doxycycline (VIBRA-TABS) tablet 100 mg  100 mg Oral Q12H    ascorbic acid (vitamin C) (VITAMIN C) tablet 500 mg  500 mg Oral BID    zinc sulfate (ZINCATE) 50 mg zinc (220 mg) capsule 1 Capsule  1 Capsule Oral DAILY WITH LUNCH     ______________________________________________________________________  EXPECTED LENGTH OF STAY: - - -  ACTUAL LENGTH OF STAY:          0                 Nancie Houston MD

## 2021-12-08 NOTE — ROUTINE PROCESS
TRANSFER - OUT REPORT:    Verbal report given to [facility] (name) on [facility]  being transferred to (unit) for routine progression of care       Report consisted of patients Situation, Background, Assessment and   Recommendations(SBAR). Information from the following report(s) SBAR, ED Summary, Intake/Output, MAR and Recent Results was reviewed with the receiving nurse. Lines:   Peripheral IV 12/07/21 Left Antecubital (Active)   Site Assessment Clean, dry, & intact 12/07/21 2042   Phlebitis Assessment 0 12/07/21 2042   Infiltration Assessment 0 12/07/21 2042   Dressing Status Clean, dry, & intact 12/07/21 2042   Dressing Type Transparent 12/07/21 2042   Hub Color/Line Status Blue; Flushed; Patent 12/07/21 2042   Action Taken Blood drawn 12/07/21 2042        Opportunity for questions and clarification was provided.       Patient transported with:   O2 @ 3 liters  RN

## 2021-12-08 NOTE — ED NOTES
0217 95% on 3L (MD at bedside)  0218 Turned off 02  0223 89% on RA  0224 94% on 2.5L (MD turned back on Oxygen)

## 2021-12-08 NOTE — ED TRIAGE NOTES
Pt arrives from home c/o Covid. Pt reports she was dx on Friday. Pt reports O2 sats in the 80's at home. 93% on room air at rest.  88% when ambulatory. Pt placed on 2L nasal cannula in triage.

## 2021-12-08 NOTE — H&P
[address] [facility] Adult  Hospitalist Group  History and Physical    Primary Care Provider: Mena Harper  Date of Service:  12/8/2021    Chief Complaint: Shortness of breath    Subjective:     Kelby Perez is a 46 y.o. female with past medical history of ADHD came to the hospital with a chief complaint of shortness of breath and low oxygen saturation. Patient states that she was having symptoms of fever, chills body aches, shortness of breath, cough for about 12 days. Patient states that initially she tested negative but then was tested positive last week. Patient states that her  and children also had COVID-19. Patient states that she did not seen. [facility] where she was prescribed doxycycline. She was told to monitor her oxygen saturations-while at home she noted that oxygen saturation was less than 90%, she came to the emergency room for further evaluation. In the emergency room, she received Decadron. Chest x-ray showed right lower lobe infiltrate    Review of Systems:    A comprehensive review of systems was negative except for that written in the History of Present Illness. History reviewed. No pertinent past medical history. Past Surgical History:   Procedure Laterality Date    HX CHOLECYSTECTOMY      2011     Prior to Admission medications    Medication Sig Start Date End Date Taking? Authorizing Provider   methocarbamol (ROBAXIN) 750 mg tablet Take 1 Tab by mouth three (3) times daily. 11/28/18   Rande Osler, MD   methylphenidate ER 54 mg 24 hr tab Take 54 mg by mouth as needed. Other, MD Susana     No Known Allergies   History reviewed. No pertinent family history. SOCIAL HISTORY:  Patient resides at Home.   Patient ambulates independently  Smoking history: never  Alcohol history: Does not drink alcohol        Objective:       Physical Exam:   Gen:  Well-developed, well-nourished, in no acute distress  HEENT: anicteric, no pallor,hearing intact to voice, moist mucous membranes,sinus non tender  Neck:  Supple, without masses, thyroid non-tender  Resp:  No accessory muscle use, decreased breath sounds at bases without wheezes rales or rhonchi  Card:  No murmurs, normal S1, S2 without thrills, bruits or peripheral edema  Abd:  Soft, non-tender, non-distended, normoactive bowel sounds are present  Skin:  No rashes or ulcers, skin turgor is good  Neuro: Alert and oriented x3, muscle strength and sensation intact           ECG: Normal sinus rhythm with nonspecific T wave inversions in anterior leads    Data Review: All diagnostic labs and studies have been reviewed. XR CHEST PORT    Result Date: 12/8/2021  Right lower lobe consolidation. Assessment:     #Acute hypoxemic respiratory failure  #COVID-19 pneumonia  -Oxygen saturation on room air 89%, she was placed on 2 L oxygen.  -Start Decadron.  -She states that she has symptoms for 12 days now-probably out of window for benefit of remdesivir. Can consider based on further clinical course. -She qualifies for barcitunib as CRP less than 7, I have provided information for the patient-start the medication if patient agrees.  -Procalcitonin within normal limits. Patient said she took 2 doses of doxycycline since yesterday.  -Lovenox per protocol        #Mild AST/ALT elevation due to COVID-19. Trend CMP daily    #Morbid obesity    #ADHD-patient states that she takes Concerta as needed.       Signed By: Alexa Wilder MD     December 8, 2021

## 2021-12-08 NOTE — ED PROVIDER NOTES
The history is provided by the patient. Fatigue  This is a new problem. The current episode started more than 1 week ago (was diagnosed with COVID 4 days ago and has had ongoing fatigue and hypoxia at home on room air. close to 2 weeks of symptoms total. unvaccinated. ). The problem has been gradually worsening. There was no focality noted. Pertinent negatives include no focal weakness and no mental status change. There has been no fever. Pertinent negatives include no shortness of breath, no vomiting, no altered mental status and no confusion. There were no medications administered prior to arrival.        History reviewed. No pertinent past medical history. Past Surgical History:   Procedure Laterality Date    HX CHOLECYSTECTOMY      2011         History reviewed. No pertinent family history. Social History     Socioeconomic History    Marital status:      Spouse name: Not on file    Number of children: Not on file    Years of education: Not on file    Highest education level: Not on file   Occupational History    Not on file   Tobacco Use    Smoking status: Never Smoker    Smokeless tobacco: Never Used   Substance and Sexual Activity    Alcohol use: Yes     Comment: rarely    Drug use: No    Sexual activity: Not on file   Other Topics Concern    Not on file   Social History Narrative    Not on file     Social Determinants of Health     Financial Resource Strain:     Difficulty of Paying Living Expenses: Not on file   Food Insecurity:     Worried About Running Out of Food in the Last Year: Not on file    Dianne of Food in the Last Year: Not on file   Transportation Needs:     Lack of Transportation (Medical): Not on file    Lack of Transportation (Non-Medical):  Not on file   Physical Activity:     Days of Exercise per Week: Not on file    Minutes of Exercise per Session: Not on file   Stress:     Feeling of Stress : Not on file   Social Connections:     Frequency of Communication with Friends and Family: Not on file    Frequency of Social Gatherings with Friends and Family: Not on file    Attends Taoism Services: Not on file    Active Member of Clubs or Organizations: Not on file    Attends Club or Organization Meetings: Not on file    Marital Status: Not on file   Intimate Partner Violence:     Fear of Current or Ex-Partner: Not on file    Emotionally Abused: Not on file    Physically Abused: Not on file    Sexually Abused: Not on file   Housing Stability:     Unable to Pay for Housing in the Last Year: Not on file    Number of [place] in the Last Year: Not on file    Unstable Housing in the Last Year: Not on file         ALLERGIES: Patient has no known allergies. Review of Systems   Constitutional: Positive for fatigue. Respiratory: Negative for shortness of breath. Gastrointestinal: Negative for vomiting. Neurological: Negative for focal weakness. Psychiatric/Behavioral: Negative for confusion. All other systems reviewed and are negative. Vitals:    12/07/21 1926   BP: 137/69   Pulse: 96   Resp: 18   Temp: 97.8 °F (36.6 °C)   SpO2: 93%            Physical Exam  Vitals and nursing note reviewed. Constitutional:       General: She is not in acute distress. Appearance: She is well-developed. HENT:      Head: Normocephalic and atraumatic. Eyes:      Conjunctiva/sclera: Conjunctivae normal.   Cardiovascular:      Rate and Rhythm: Normal rate and regular rhythm. Pulmonary:      Effort: Pulmonary effort is normal. No respiratory distress. Abdominal:      General: There is no distension. Musculoskeletal:         General: No deformity. Normal range of motion. Cervical back: Neck supple. Skin:     General: Skin is warm and dry. Neurological:      Mental Status: She is alert. Cranial Nerves: No cranial nerve deficit.    Psychiatric:         Behavior: Behavior normal.          [facility]  ED Course as of 12/08/21 0000   Tue Dec 07, 2021   2035 COVID positive  EKG interpretation:   Rhythm: normal sinus rhythm; and regular . Rate (approx.): 79; Axis: normal; Intervals: normal ; ST/T wave: non-specific changes; EKG documented and interpreted by Jorge Nguyen. Lalo Escobar MD, Emergency Medicine.   [AL]      ED Course User Index  [AL] Roma Ballard MD     46 y.o. female presents with respiratory failure as low as mid [de-identified] on room air pulse oximetry at home. Drops here on ambulation. On 2L by NC. Decadron administered during lengthy waiting room stay. Attempted to initiate as much workup and treatment as possible prior to arrival in room including oxygen therapy. HDS. Procedures    Perfect Serve Consult for Admission  12:00 AM    ED Room Number: ER12/12  Patient Name and age:  Joan Ling 46 y.o.  female  Working Diagnosis:   1. Pneumonia due to COVID-19 virus    2.  Acute respiratory failure with hypoxemia ([address]        COVID-19 Suspicion:  yes  Sepsis present:  no  Reassessment needed: no  Code Status:  Full Code  Readmission: no  Isolation Requirements:  yes  Recommended Level of Care:  telemetry  Department:[facility] Adult ED - 21

## 2021-12-09 LAB
ALBUMIN SERPL-MCNC: 2.8 G/DL (ref 3.5–5)
ALBUMIN/GLOB SERPL: 0.6 {RATIO} (ref 1.1–2.2)
ALP SERPL-CCNC: 64 U/L (ref 45–117)
ALT SERPL-CCNC: 80 U/L (ref 12–78)
ANION GAP SERPL CALC-SCNC: 6 MMOL/L (ref 5–15)
AST SERPL-CCNC: 41 U/L (ref 15–37)
BASOPHILS # BLD: 0 K/UL (ref 0–0.1)
BASOPHILS NFR BLD: 0 % (ref 0–1)
BILIRUB SERPL-MCNC: 0.4 MG/DL (ref 0.2–1)
BUN SERPL-MCNC: 14 MG/DL (ref 6–20)
BUN/CREAT SERPL: 16 (ref 12–20)
CALCIUM SERPL-MCNC: 9.3 MG/DL (ref 8.5–10.1)
CHLORIDE SERPL-SCNC: 109 MMOL/L (ref 97–108)
CO2 SERPL-SCNC: 21 MMOL/L (ref 21–32)
CREAT SERPL-MCNC: 0.87 MG/DL (ref 0.55–1.02)
CRP SERPL-MCNC: 3.11 MG/DL (ref 0–0.6)
D DIMER PPP FEU-MCNC: 0.64 MG/L FEU (ref 0–0.65)
DIFFERENTIAL METHOD BLD: ABNORMAL
EOSINOPHIL # BLD: 0 K/UL (ref 0–0.4)
EOSINOPHIL NFR BLD: 0 % (ref 0–7)
ERYTHROCYTE [DISTWIDTH] IN BLOOD BY AUTOMATED COUNT: 12.5 % (ref 11.5–14.5)
GLOBULIN SER CALC-MCNC: 4.6 G/DL (ref 2–4)
GLUCOSE SERPL-MCNC: 161 MG/DL (ref 65–100)
HCT VFR BLD AUTO: 37.3 % (ref 35–47)
HGB BLD-MCNC: 12.4 G/DL (ref 11.5–16)
IMM GRANULOCYTES # BLD AUTO: 0 K/UL
IMM GRANULOCYTES NFR BLD AUTO: 0 %
LYMPHOCYTES # BLD: 1.2 K/UL (ref 0.8–3.5)
LYMPHOCYTES NFR BLD: 18 % (ref 12–49)
MCH RBC QN AUTO: 29.2 PG (ref 26–34)
MCHC RBC AUTO-ENTMCNC: 33.2 G/DL (ref 30–36.5)
MCV RBC AUTO: 88 FL (ref 80–99)
METAMYELOCYTES NFR BLD MANUAL: 1 %
MONOCYTES # BLD: 0.5 K/UL (ref 0–1)
MONOCYTES NFR BLD: 8 % (ref 5–13)
NEUTS SEG # BLD: 4.9 K/UL (ref 1.8–8)
NEUTS SEG NFR BLD: 73 % (ref 32–75)
NRBC # BLD: 0 K/UL (ref 0–0.01)
NRBC BLD-RTO: 0 PER 100 WBC
PLATELET # BLD AUTO: 381 K/UL (ref 150–400)
PMV BLD AUTO: 10.1 FL (ref 8.9–12.9)
POTASSIUM SERPL-SCNC: 4 MMOL/L (ref 3.5–5.1)
PROT SERPL-MCNC: 7.4 G/DL (ref 6.4–8.2)
RBC # BLD AUTO: 4.24 M/UL (ref 3.8–5.2)
RBC MORPH BLD: ABNORMAL
RBC MORPH BLD: ABNORMAL
SODIUM SERPL-SCNC: 136 MMOL/L (ref 136–145)
WBC # BLD AUTO: 6.7 K/UL (ref 3.6–11)

## 2021-12-09 PROCEDURE — G0378 HOSPITAL OBSERVATION PER HR: HCPCS

## 2021-12-09 PROCEDURE — 36415 COLL VENOUS BLD VENIPUNCTURE: CPT

## 2021-12-09 PROCEDURE — 96376 TX/PRO/DX INJ SAME DRUG ADON: CPT

## 2021-12-09 PROCEDURE — 85379 FIBRIN DEGRADATION QUANT: CPT

## 2021-12-09 PROCEDURE — 96372 THER/PROPH/DIAG INJ SC/IM: CPT

## 2021-12-09 PROCEDURE — 86140 C-REACTIVE PROTEIN: CPT

## 2021-12-09 PROCEDURE — 80053 COMPREHEN METABOLIC PANEL: CPT

## 2021-12-09 PROCEDURE — 74011250636 HC RX REV CODE- 250/636: Performed by: HOSPITALIST

## 2021-12-09 PROCEDURE — 74011250637 HC RX REV CODE- 250/637: Performed by: HOSPITALIST

## 2021-12-09 PROCEDURE — 65660000000 HC RM CCU STEPDOWN

## 2021-12-09 PROCEDURE — 85025 COMPLETE CBC W/AUTO DIFF WBC: CPT

## 2021-12-09 RX ADMIN — ZINC SULFATE 220 MG (50 MG) CAPSULE 1 CAPSULE: CAPSULE at 14:17

## 2021-12-09 RX ADMIN — OXYCODONE HYDROCHLORIDE AND ACETAMINOPHEN 500 MG: 500 TABLET ORAL at 18:29

## 2021-12-09 RX ADMIN — DEXAMETHASONE SODIUM PHOSPHATE 6 MG: 4 INJECTION, SOLUTION INTRAMUSCULAR; INTRAVENOUS at 14:17

## 2021-12-09 RX ADMIN — ENOXAPARIN SODIUM 40 MG: 100 INJECTION SUBCUTANEOUS at 18:29

## 2021-12-09 RX ADMIN — OXYCODONE HYDROCHLORIDE AND ACETAMINOPHEN 500 MG: 500 TABLET ORAL at 08:12

## 2021-12-09 RX ADMIN — DOXYCYCLINE HYCLATE 100 MG: 100 TABLET, COATED ORAL at 08:12

## 2021-12-09 RX ADMIN — ENOXAPARIN SODIUM 40 MG: 100 INJECTION SUBCUTANEOUS at 04:30

## 2021-12-09 NOTE — PROGRESS NOTES
[facility] [facility] Adult  Hospitalist Group                                                                                          Hospitalist Progress Note  Aislinn Weinstein MD  Answering service: [phone] OR [phone] from in house phone        Date of Service:  2021  NAME:  Matt Adler  :  1970  MRN:  [id number]      Admission Summary:   Matt Adler is a 46 y.o. female with past medical history of ADHD came to the hospital with a chief complaint of shortness of breath and low oxygen saturation.     Patient states that she was having symptoms of fever, chills body aches, shortness of breath, cough for about 12 days. Patient states that initially she tested negative but then was tested positive last week. Patient states that her  and children also had COVID-19. Patient states that she did not seen. She was seen at [facility] first yesterday where she was prescribed doxycycline. She was told to monitor her oxygen saturations-while at home she noted that oxygen saturation was less than 90%, she came to the emergency room for further evaluation.     In the emergency room, she received Decadron. Chest x-ray showed right lower lobe infiltrate    Interval history / Subjective:   Patient seen for follow-up of acute hypoxic respiratory failure secondary to Covid pneumonia. Patient seen and examined earlier this morning. Patient reports feeling better and is on 1 L of oxygen at the time of exam.     Assessment & Plan:     #Acute hypoxemic respiratory failure  #COVID-19 pneumonia  -Oxygen saturation on room air 89%, she was placed on 2 L oxygen.  -Start Decadron. Continue sliding scale insulin  -She states that she has symptoms for 12 days now-probably out of window for benefit of remdesivir. Can consider based on further clinical course.   -She qualifies for barcitunib as CRP less than 7, I have provided information for the patient-start the medication if patient agrees. -Doxycycline discontinued due to normal procalcitonin  -Lovenox per protocol       #Mild AST/ALT elevation due to COVID-19. Trend CMP daily     #Morbid obesity     #ADHD-patient states that she takes Concerta as needed. They are weaning the patient off of oxygen. She was on 1 L earlier today with and is now on no oxygen but still somewhat tachypneic. We will walk her and see if she is able to leave this afternoon. The patient may have staying until tomorrow morning to make sure that she can be off of oxygen. LAQUITA consult placed when she was on O2.      Code status: Full  DVT prophylaxis: Lovenox    Care Plan discussed with: Patient/Family and Nurse  Anticipated Disposition: Home w/Family  Anticipated Discharge: Less than 24 hours     Hospital Problems  Never Reviewed          Codes Class Noted POA    COVID-19 ICD-10-CM: U07.1  ICD-9-CM: 079.89  12/8/2021 Unknown                Review of Systems:   Pertinent items are noted in HPI. Vital Signs:    Last 24hrs VS reviewed since prior progress note. Most recent are:  Visit Vitals  BP (!) 147/87 (BP 1 Location: Right upper arm, BP Patient Position: Sitting)   Pulse (!) 59   Temp 98.3 °F (36.8 °C)   Resp 18   Ht 5' 7\" (1.702 m)   Wt 103 kg (227 lb 1.2 oz)   SpO2 93%   BMI 35.56 kg/m²       No intake or output data in the 24 hours ending 12/09/21 1815     Physical Examination:     I had a face to face encounter with this patient and independently examined them on 12/9/2021 as outlined below:          Constitutional:  No acute distress, cooperative, pleasant    ENT:  Oral mucosa moist, oropharynx benign. Resp:  CTA bilaterally. No wheezing/rhonchi/rales. No accessory muscle use   CV:  Regular rhythm, normal rate, no murmurs, gallops, rubs    GI:  Soft, non distended, non tender. normoactive bowel sounds, no hepatosplenomegaly     Musculoskeletal:  No edema, warm, 2+ pulses throughout    Neurologic:  Moves all extremities.   AAOx3, CN II-XII reviewed Data Review:    I personally reviewed  Image and labs      Labs:     Recent Labs     12/09/21 0137 12/07/21 2032   WBC 6.7 5.5   HGB 12.4 12.1   HCT 37.3 36.9    311     Recent Labs     12/09/21 0137 12/07/21 2032    137   K 4.0 3.7   * 103   CO2 21 26   BUN 14 8   CREA 0.87 0.77   * 102*   CA 9.3 8.6   MG  --  2.5*     Recent Labs     12/09/21 0137 12/07/21 2032   ALT 80* 83*   AP 64 74   TBILI 0.4 0.5   TP 7.4 7.2   ALB 2.8* 2.9*   GLOB 4.6* 4.3*     Recent Labs     12/07/21 2032   INR 0.9   PTP 9.8   APTT 25.6      Recent Labs     12/07/21 2032   FERR 1,015*      No results found for: FOL, RBCF   No results for input(s): PH, PCO2, PO2 in the last 72 hours. No results for input(s): CPK, CKNDX, TROIQ in the last 72 hours.     No lab exists for component: CPKMB  No results found for: CHOL, CHOLX, CHLST, CHOLV, HDL, HDLP, LDL, LDLC, DLDLP, TGLX, TRIGL, TRIGP, CHHD, CHHDX  No results found for: GLUCPOC  No results found for: COLOR, APPRN, SPGRU, REFSG, KOMAL, PROTU, GLUCU, KETU, BILU, UROU, SHAR, LEUKU, GLUKE, EPSU, BACTU, WBCU, RBCU, CASTS, UCRY      Medications Reviewed:     Current Facility-Administered Medications   Medication Dose Route Frequency    dexamethasone (DECADRON) 4 mg/mL injection 6 mg  6 mg IntraVENous Q24H    enoxaparin (LOVENOX) injection 40 mg  40 mg SubCUTAneous Q12H    ascorbic acid (vitamin C) (VITAMIN C) tablet 500 mg  500 mg Oral BID    zinc sulfate (ZINCATE) 50 mg zinc (220 mg) capsule 1 Capsule  1 Capsule Oral DAILY WITH LUNCH     ______________________________________________________________________  EXPECTED LENGTH OF STAY: - - -  ACTUAL LENGTH OF STAY:          0                 Nery Barahona MD

## 2021-12-09 NOTE — PROGRESS NOTES
Pulse oximetry assessment   94% at rest on room air (if 88% or less, skip next steps)  91% while ambulating on room air  95% at rest on 1LPM  95% while ambulating on 1LPM

## 2021-12-10 VITALS
TEMPERATURE: 98.1 F | DIASTOLIC BLOOD PRESSURE: 80 MMHG | RESPIRATION RATE: 17 BRPM | WEIGHT: 227.07 LBS | HEIGHT: 67 IN | BODY MASS INDEX: 35.64 KG/M2 | HEART RATE: 68 BPM | SYSTOLIC BLOOD PRESSURE: 120 MMHG | OXYGEN SATURATION: 94 %

## 2021-12-10 PROBLEM — J96.91 RESPIRATORY FAILURE WITH HYPOXIA (HCC): Status: ACTIVE | Noted: 2021-12-10

## 2021-12-10 PROBLEM — J96.91 RESPIRATORY FAILURE WITH HYPOXIA (HCC): Status: RESOLVED | Noted: 2021-12-10 | Resolved: 2021-12-10

## 2021-12-10 LAB
ALBUMIN SERPL-MCNC: 2.8 G/DL (ref 3.5–5)
ALBUMIN/GLOB SERPL: 0.7 {RATIO} (ref 1.1–2.2)
ALP SERPL-CCNC: 61 U/L (ref 45–117)
ALT SERPL-CCNC: 67 U/L (ref 12–78)
ANION GAP SERPL CALC-SCNC: 5 MMOL/L (ref 5–15)
AST SERPL-CCNC: 26 U/L (ref 15–37)
BASOPHILS # BLD: 0 K/UL (ref 0–0.1)
BASOPHILS NFR BLD: 0 % (ref 0–1)
BILIRUB SERPL-MCNC: 0.4 MG/DL (ref 0.2–1)
BUN SERPL-MCNC: 15 MG/DL (ref 6–20)
BUN/CREAT SERPL: 22 (ref 12–20)
CALCIUM SERPL-MCNC: 9.2 MG/DL (ref 8.5–10.1)
CHLORIDE SERPL-SCNC: 107 MMOL/L (ref 97–108)
CO2 SERPL-SCNC: 25 MMOL/L (ref 21–32)
CREAT SERPL-MCNC: 0.68 MG/DL (ref 0.55–1.02)
DIFFERENTIAL METHOD BLD: ABNORMAL
EOSINOPHIL # BLD: 0 K/UL (ref 0–0.4)
EOSINOPHIL NFR BLD: 0 % (ref 0–7)
ERYTHROCYTE [DISTWIDTH] IN BLOOD BY AUTOMATED COUNT: 12.4 % (ref 11.5–14.5)
GLOBULIN SER CALC-MCNC: 4.2 G/DL (ref 2–4)
GLUCOSE SERPL-MCNC: 140 MG/DL (ref 65–100)
HCT VFR BLD AUTO: 36.1 % (ref 35–47)
HGB BLD-MCNC: 12 G/DL (ref 11.5–16)
IMM GRANULOCYTES # BLD AUTO: 0.1 K/UL (ref 0–0.04)
IMM GRANULOCYTES NFR BLD AUTO: 1 % (ref 0–0.5)
LYMPHOCYTES # BLD: 1.3 K/UL (ref 0.8–3.5)
LYMPHOCYTES NFR BLD: 11 % (ref 12–49)
MCH RBC QN AUTO: 29.1 PG (ref 26–34)
MCHC RBC AUTO-ENTMCNC: 33.2 G/DL (ref 30–36.5)
MCV RBC AUTO: 87.6 FL (ref 80–99)
MONOCYTES # BLD: 0.7 K/UL (ref 0–1)
MONOCYTES NFR BLD: 5 % (ref 5–13)
NEUTS SEG # BLD: 10.4 K/UL (ref 1.8–8)
NEUTS SEG NFR BLD: 83 % (ref 32–75)
NRBC # BLD: 0 K/UL (ref 0–0.01)
NRBC BLD-RTO: 0 PER 100 WBC
PLATELET # BLD AUTO: 470 K/UL (ref 150–400)
PMV BLD AUTO: 9.8 FL (ref 8.9–12.9)
POTASSIUM SERPL-SCNC: 3.9 MMOL/L (ref 3.5–5.1)
PROT SERPL-MCNC: 7 G/DL (ref 6.4–8.2)
RBC # BLD AUTO: 4.12 M/UL (ref 3.8–5.2)
SODIUM SERPL-SCNC: 137 MMOL/L (ref 136–145)
WBC # BLD AUTO: 12.4 K/UL (ref 3.6–11)

## 2021-12-10 PROCEDURE — 74011250636 HC RX REV CODE- 250/636: Performed by: HOSPITALIST

## 2021-12-10 PROCEDURE — G0378 HOSPITAL OBSERVATION PER HR: HCPCS

## 2021-12-10 PROCEDURE — 85025 COMPLETE CBC W/AUTO DIFF WBC: CPT

## 2021-12-10 PROCEDURE — 74011250637 HC RX REV CODE- 250/637: Performed by: HOSPITALIST

## 2021-12-10 PROCEDURE — 80053 COMPREHEN METABOLIC PANEL: CPT

## 2021-12-10 PROCEDURE — 65660000000 HC RM CCU STEPDOWN

## 2021-12-10 PROCEDURE — 77010033678 HC OXYGEN DAILY

## 2021-12-10 PROCEDURE — 36415 COLL VENOUS BLD VENIPUNCTURE: CPT

## 2021-12-10 PROCEDURE — 96372 THER/PROPH/DIAG INJ SC/IM: CPT

## 2021-12-10 RX ORDER — DEXAMETHASONE 6 MG/1
TABLET ORAL
Qty: 7 TABLET | Refills: 0 | Status: SHIPPED | OUTPATIENT
Start: 2021-12-10

## 2021-12-10 RX ADMIN — ENOXAPARIN SODIUM 40 MG: 100 INJECTION SUBCUTANEOUS at 03:43

## 2021-12-10 RX ADMIN — OXYCODONE HYDROCHLORIDE AND ACETAMINOPHEN 500 MG: 500 TABLET ORAL at 09:45

## 2021-12-10 RX ADMIN — DEXAMETHASONE SODIUM PHOSPHATE 6 MG: 4 INJECTION, SOLUTION INTRAMUSCULAR; INTRAVENOUS at 12:47

## 2021-12-10 NOTE — ADT AUTH CERT NOTES
INPATIENT ADMISSION NOTIF     ADMISSION DATE 2021    UR CONTACT - Rupesh Amezquita. 18.   UR [phone]  UR Sherri 30 [phone]    Marcelina Wetzel! [address]     FACILITY NPI : [id number]  FACILITY TAX ID : [id number]     [address] [id number]  [phone]            Patient Name :Jeremiah Carvajal   : 1970 (51 yrs)  MRN : [id number]     Patient Mailing Address [address] [facility] [address] [53] , [address]                                                               .         Insurance Plan Payor: BLUE CROSS      Primary Coverage Subscriber ID : [id number]             Current Patient Class : INPATIENT  Admit Date : 2021     REQUESTED LEVEL OF CARE: INPATIENT [101]                                                      ]  Diagnosis : Respiratory failure with hypoxia (HCC);COVID-19                     COVID-19     ICD10 Code : JNOOJ-20 [U07.1]  Respiratory failure with hypoxia [facility]) [J96.91]          Admitting and Attending Info:  Admitting Provider : Gloria Weiner MD     NPI: [id number]  Admitting Provider Phone. [phone]  Admitting Provider Address SAME AS FACILITY         Patient Demographics    Patient Name   Hetal Jesus Legal Sex   Female    1970 Address   [address] [id number] Phone   [phone] [facility] Account    Name Acct ID Class Status Primary Coverage   Hetal Jesus [id number] INPATIENT Discharged/Not Billed TAMERA Daugherty            Guarantor Account (for Hospital Account [de-identified])    Name Relation to Pt Service Area Active?  Acct Type   Hetal Jesus [facility] Yes Personal/Family   Address Phone     [address] [facility], [place] [address]               Coverage Information (for Hospital Account [de-identified])    F/O Payor/Plan Subscriber  Subscriber Sex Precert #   BLUE CROSS/[employer] 00 M    Subscriber Subscriber #   Alexandra Homans [id number]   [facility] # Group Name   [id number]    Address Phone   [address]    Policy Number Status Effective Date Benefits Phone   [id number] -  -   Auth/Cert   NAE             Diagnosis     Codes Comments   Pneumonia due to COVID-19 virus  ICD-10-CM: U07.1, J12.82   ICD-9-CM: 480.8, 079.89             Admission Information    Arrival Date/Time: 2021 1912 Admit Date/Time: 2021 2358 IP Adm.  Date/Time: 12/10/2021 0925   Admission Type: Emergency Point of Origin: Non-health Care Facility/self Admit Category:    Means of Arrival: Car Primary Service: Medicine Secondary Service: N/A   Transfer Source:  Service Area: [facility] Unit: [address] Provider: Galo Fenton MD Attending Provider: Basia Pineda MD Referring Provider:      Admission Information    Attending Provider Admission Dx Admitted on    COVID-19, Respiratory failure with hypoxia ([facility] [address] 21   Service Isolation Code Status   Medicine Droplet Plus Full Code   Allergies Advance Care Planning    No Known Allergies Jump to the Activity       Admission Information    Unit/Bed: [facility] 6E MED ONCOLOGY Service: Medicine   Admitting provider: Galo Fenton MD Phone: [phone]   Attending provider:  Phone:    PCP: Wendy Welsh Phone: [phone]   Admission dx:  Patient class: I   Admission type: ER       Patient Demographics    Patient Name   Cesario Judd   [id number] Legal Sex   Female    1970 Address   [address] [place] [address] Phone   [phone] (Mobile)     H&P Notes       H&P by Sharona Ribera MD at 21 0127 documented on ED to Hosp-Admission (Discharged) from 2021 in [facility]    Author: Sharona Ribera MD Author Type: Physician Filed: 21 [id number]   Note Status: Addendum Cosign: Cosign Not Required Date of Service: 12/08/21 0127   : Aida Rodriguez MD (Physician)       Prior Versions: 1. Aida Rodriguez MD (Physician) at 12/08/21 0542 - Addendum    2. Aida Rodriguez MD (Physician) at 12/08/21 8482 - [phone] [facility] Adult  Hospitalist Group  History and Physical     Primary Care Provider: Kenny Monroy  Date of Service:  12/8/2021     Chief Complaint: Shortness of breath     Subjective:      Bessy Hansen is a 46 y.o. female with past medical history of ADHD came to the hospital with a chief complaint of shortness of breath and low oxygen saturation.     Patient states that she was having symptoms of fever, chills body aches, shortness of breath, cough for about 12 days. Patient states that initially she tested negative but then was tested positive last week. Patient states that her  and children also had COVID-19. Patient states that she did not seen. [facility] where she was prescribed doxycycline. She was told to monitor her oxygen saturations-while at home she noted that oxygen saturation was less than 90%, she came to the emergency room for further evaluation.     In the emergency room, she received Decadron. Chest x-ray showed right lower lobe infiltrate     Review of Systems:     A comprehensive review of systems was negative except for that written in the History of Present Illness.      History reviewed. No pertinent past medical history. Past Surgical History:   Procedure Laterality Date    HX CHOLECYSTECTOMY         2011              Prior to Admission medications    Medication Sig Start Date End Date Taking? Authorizing Provider   methocarbamol (ROBAXIN) 750 mg tablet Take 1 Tab by mouth three (3) times daily. 11/28/18     Rohini Contreras MD   methylphenidate ER 54 mg 24 hr tab Take 54 mg by mouth as needed.       Other, MD Susana      No Known Allergies   History reviewed.  No pertinent family history.      SOCIAL HISTORY:  Patient resides at Home. Patient ambulates independently  Smoking history: never  Alcohol history: Does not drink alcohol           Objective:         Physical Exam:   Gen:  Well-developed, well-nourished, in no acute distress  HEENT:  anicteric, no pallor,hearing intact to voice, moist mucous membranes,sinus non tender  Neck:  Supple, without masses, thyroid non-tender  Resp:  No accessory muscle use, decreased breath sounds at bases without wheezes rales or rhonchi  Card:  No murmurs, normal S1, S2 without thrills, bruits or peripheral edema  Abd:  Soft, non-tender, non-distended, normoactive bowel sounds are present  Skin:  No rashes or ulcers, skin turgor is good  Neuro: Alert and oriented x3, muscle strength and sensation intact              ECG: Normal sinus rhythm with nonspecific T wave inversions in anterior leads     Data Review: All diagnostic labs and studies have been reviewed.     XR CHEST PORT     Result Date: 2021  Right lower lobe consolidation.           Assessment:      #Acute hypoxemic respiratory failure  #COVID-19 pneumonia  -Oxygen saturation on room air 89%, she was placed on 2 L oxygen.  -Start Decadron.  -She states that she has symptoms for 12 days now-probably out of window for benefit of remdesivir. Can consider based on further clinical course. -She qualifies for barcitunib as CRP less than 7, I have provided information for the patient-start the medication if patient agrees.  -Procalcitonin within normal limits. Patient said she took 2 doses of doxycycline since yesterday.  -Lovenox per protocol           #Mild AST/ALT elevation due to COVID-19.   Trend CMP daily     #Morbid obesity     #ADHD-patient states that she takes Concerta as needed.        Signed By: Shelley Chaves MD      2021                          Patient Demographics    Patient Name   Nicolasa Serna   [id number] Legal Sex   Female    1970 Address [address] Phone   [phone] (Mobile)   CSN:   [id number]     Admit Date: Admit Time Room Bed   Dec 7, 2021 11:58 PM 0421 96 [phone] 27       Attending Providers    Provider Pager From To   Andree Alcantara MD  21   Gurpreet Toledo MD  21   Alexander Hicks MD  21   Amie Dorman MD  12/09/21 12/10/21     Emergency Contact(s)    Name Relation Home Work [id number] [facility] Spouse [phone]       Utilization Reviews         PA Recommendation by Walt Mcardle, RN       Review Entered Review Status   12/10/2021 08:51 In Primary      Criteria Review   We recommend that the following pt's hospitalization under OBSERVATION [104] status is upgraded to INPATIENT If you agree, please place a new ADMIT order in [address] [facility] as recommended.     Name: Eric Torres   : 1970   [facility]# : [id number]  Insurance: [employer]     Clinical summary patient with shortness of breath  Vitals hypoxic  Labs and Imaging COVID-19 pneumonia  MCG criteria applies YES  Comments patient with shortness of breath, found to be COVID-19 positive, desaturated, patient with acute hypoxic respiratory failure on oxygen support, receiving Decadron, work-up in progress, needing medical optimization    This chart was reviewed at 7:53 AM 12/10/2021    Cassandra Carpio MD   Physician [address] [facility]   Cell [phone]        Viral Illness, Acute - Care Day 2 (2021) by Walt Mcardle, RN       Review Entered Review Status   2021 16:00 Completed      Criteria Review      Care Day: 2 Care Date: 2021 Level of Care:    Guideline Day 1    Level Of Care    (X) ICU or floor    2021 16:00:44 EST by Walt Mcardle      remote [facility]    Clinical Status    ( ) * Clinical Indications met    2021 16:00:44 EST by Walt Mcardle      98.3 78 147/87 18 91%RA    Routes    (X) Parenteral or oral medications    2021 16:00:44 EST by Beatrobo      decadron 6mg ivq24    (X) Liquid or usual diet    12/9/2021 16:00:44 EST by Beatrobo      regular diet    Interventions    (X) Possible isolation    12/9/2021 16:00:44 EST by Beatrobo      droplet plus    (X) CBC with differential, chemistries, renal and hepatic function testing, C-reactive protein, coagulation panel    12/9/2021 16:00:44 EST by Beatrobo      crp 3.11    Medications    (X) Possible antibiotic (eg, for bacterial coinfection or superinfection)    12/9/2021 16:00:44 EST by Beatrobo      doxycycline 100mg poq12    (X) Possible DVT prophylaxis    12/9/2021 16:00:44 EST by Beatrobo      lovenox 40mg scq12    * Milestone   Additional Notes   12/9 LOC OBS Remote Tele      Labs   D-dimer: 0.64   Sodium: 136   Potassium: 4.0   Chloride: 109 (H)   CO2: 21   Anion gap: 6   Glucose: 161 (H)   BUN: 14   Creatinine: 0.87   BUN/Creatinine ratio: 16   Calcium: 9.3   GFR est non-AA: >60   GFR est AA: >60   Bilirubin, total: 0.4   Protein, total: 7.4   Albumin: 2.8 (L)   Globulin: 4.6 (H)   A-G Ratio: 0.6 (L)   ALT: 80 (H)   AST: 41 (H)   Alk. phosphatase: 64   C-Reactive protein: 3.11 (H)   C REACTIVE PROTEIN, QT: Rpt (A)      IM   Interval history / Subjective:   Patient seen for follow-up of acute hypoxic respiratory failure secondary to Covid pneumonia.  Patient seen and examined earlier this morning.  Patient reports feeling better and is on 1 L of oxygen at the time of exam.   Assessment & Plan:   #Acute hypoxemic respiratory failure   #COVID-19 pneumonia   -Oxygen saturation on room air 89%, she was placed on 2 L oxygen.   -Start Decadron.  Continue sliding scale insulin   -She states that she has symptoms for 12 days now-probably out of window for benefit of remdesivir.  Can consider based on further clinical course. -She qualifies for barcitunib as CRP less than 7, I have provided information for the patient-start the medication if patient agrees. -Doxycycline discontinued due to normal procalcitonin   -Lovenox per protocol   #Mild AST/ALT elevation due to COVID-19.  Trend CMP daily   #Morbid obesity   #ADHD-patient states that she takes Concerta as needed. Exam   Constitutional: No acute distress, cooperative, pleasant    ENT: Oral mucosa moist, oropharynx benign. Resp: CTA bilaterally. No wheezing/rhonchi/rales. No accessory muscle use   CV: Regular rhythm, normal rate, no murmurs, gallops, rubs    GI: Soft, non distended, non tender. normoactive bowel sounds, no hepatosplenomegaly     Musculoskeletal: No edema, warm, 2+ pulses throughout    Neurologic: Moves all extremities.  AAOx3, CN II-XII reviewed              Viral Illness, Acute - Care Day 1 (12/8/2021) by Sher Manning RN       Review Entered Review Status   12/9/2021 10:21 Completed      Criteria Review      Care Day: 1 Care Date: 12/8/2021 Level of Care:    Guideline Day 1    Level Of Care    (X) ICU or floor    12/9/2021 10:21:17 EST by Sher Manning      remote tele    Clinical Status    ( ) * Clinical Indications met    12/9/2021 10:21:17 EST by Sher Manning      To ED with complaints of  SOB, chills, body aches, COVID19+  98.8 74 144/71 19 93%NC3L 103kg    Routes    (X) Parenteral or oral medications    12/9/2021 10:21:17 EST by Sher Manning      vitc 500mg sb1dhubvy decadron 6mg ivq24 lovenox 40mg scq12 zincate cap podaily    (X) Liquid or usual diet    12/9/2021 10:21:17 EST by Sher Manning      reg diet    Interventions    (X) Possible isolation    12/9/2021 10:21:17 EST by Sher Manning      droplet plus iso    (X) CBC with differential, chemistries, renal and hepatic function testing, C-reactive protein, coagulation panel    12/9/2021 10:21:17 EST by Sher Manning      crp 6.08    (X) Possible chest x-ray    12/9/2021 10:21:17 EST by Sher Manning      12/7  CXR: Right lower lobe consolidation.     Medications    (X) Possible antibiotic (eg, for bacterial coinfection or superinfection)    12/9/2021 10:21:17 EST by Meenakshi Barone      doxycycline 100mg poq12    * Milestone   Additional Notes   12/8 LOC OBS Remote Tele      Labs   INR: 0.9   Prothrombin time: 9.8   aPTT: 25.6   Fibrinogen: 471   D-dimer: 1.62 (H)   Sodium: 137   Potassium: 3.7   Chloride: 103   CO2: 26   Anion gap: 8   Glucose: 102 (H)   BUN: 8   Creatinine: 0.77   BUN/Creatinine ratio: 10 (L)   Calcium: 8.6   Magnesium: 2.5 (H)   GFR est non-AA: >60   GFR est AA: >60   Bilirubin, total: 0.5   Protein, total: 7.2   Albumin: 2.9 (L)   Globulin: 4.3 (H)   A-G Ratio: 0.7 (L)   ALT: 83 (H)   AST: 73 (H)   Alk. phosphatase: 74   LD: 679 (H)   Procalcitonin: <0.05   Troponin-High Sensitivity: 8   NT pro-BNP: 140 (H)   Ferritin: 1,015 (H)   C-Reactive protein: 6.08 (H)   C REACTIVE PROTEIN, QT: Rpt (A)   SAMPLES BEING HELD: Rpt   LA 1.7      ED Tx: decadron 10mg pox1      H&P   Geoff Jacome is a 46 y.o. female with past medical history of ADHD came to the hospital with a chief complaint of shortness of breath and low oxygen saturation. Patient states that she was having symptoms of fever, chills body aches, shortness of breath, cough for about 12 days.  Patient states that initially she tested negative but then was tested positive last week.  Patient states that her  and children also had COVID-19. [facility] states that she did not seen.  She was seen at [facility] first yesterday where she was prescribed doxycycline.  She was told to monitor her oxygen saturations-while at home she noted that oxygen saturation was less than 90%, she came to the emergency room for further evaluation.    In the emergency room, she received Decadron.  Chest x-ray showed right lower lobe infiltrate   Assessment:   #Acute hypoxemic respiratory failure   #COVID-19 pneumonia   -Oxygen saturation on room air 89%, she was placed on 2 L oxygen.   -Start Decadron.   -She states that she has symptoms for 12 days now-probably out of window for benefit of Sebastián Newness consider based on further clinical course. -She qualifies for barcitunib as CRP less than 7, I have provided information for the patient-start the medication if patient agrees.   -Procalcitonin within normal limits.  Patient said she took 2 doses of doxycycline since yesterday.   -Lovenox per protocol   #Mild AST/ALT elevation due to COVID-19.  Trend CMP daily   #Morbid obesity   #ADHD-patient states that she takes Concerta as needed. IM   Interval history / Subjective:   Patient seen and examined on 3 L nasal cannula   No breathing difficulty   Assessment & Plan:   #Acute hypoxemic respiratory failure   #COVID-19 pneumonia   -Oxygen saturation on room air 89%, she was placed on 2 L oxygen.   -Start Decadron.  Continue sliding scale insulin   -She states that she has symptoms for 12 days now-probably out of window for benefit of remdesivir.  Can consider based on further clinical course.    -She qualifies for barcitunib as CRP less than 7, I have provided information for the patient-start the medication if patient agrees.   -Procalcitonin within normal limits.  Patient said she took 2 doses of doxycycline since yesterday.   -Lovenox per protocol   #Mild AST/ALT elevation due to COVID-19.  Trend CMP daily   #Morbid obesity   #ADHD-patient states that she takes Concerta as needed.        Physical Exam:    Gen:  Well-developed, well-nourished, in no acute distress   HEENT:  anicteric, no pallor,hearing intact to voice, moist mucous membranes,sinus non tender   Neck:  Supple, without masses, thyroid non-tender   Resp:  No accessory muscle use, decreased breath sounds at bases without wheezes rales or rhonchi   Card:  No murmurs, normal S1, S2 without thrills, bruits or peripheral edema   Abd:  Soft, non-tender, non-distended, normoactive bowel sounds are present   Skin:  No rashes or ulcers, skin turgor is good   Neuro: Alert and oriented x3, muscle strength and sensation intact      Viral Illness, Acute - Clinical Indications for Admission to Inpatient Care by Karthik Hall RN       Review Entered Review Status   12/9/2021 10:12 Completed      Criteria Review      Clinical Indications for Admission to Inpatient Care    Most Recent : Karthik Hall Most Recent Date: 12/9/2021 10:12:08 EST    ( ) Admission is indicated for  1 or more  of the following  [A] [B] (1) (2) (3) (4) (5) (6) (7)    (8) (9):       ( ) Systemic [A] manifestation, as indicated by  1 or more  of the following :          ( ) Hemodynamic instability          12/9/2021 10:12:08 EST by Karthik Hall            To ED with complaints of  SOB, chills, body aches, COVID19+

## 2021-12-10 NOTE — DISCHARGE INSTRUCTIONS
Patient Education        Learning About COVID-19 and Flu Symptoms  How can you tell COVID-19 from the flu? COVID-19 and the flu have similar symptoms. The two can be hard to tell apart. The only way to know for sure which illness you have is to be tested. Since the symptoms are so alike, it makes sense to act as if you have COVID-19 until your test results come back. This means staying home and limiting contact with people in your home. You'll need to wash your hands often and disinfect surfaces that you touch. And be sure to wear a mask when you're around other people. This is also good advice if you think you have the flu. COVID-19 and the flu have these symptoms in common:  · Fever or chills  · Cough  · Shortness of breath  · Fatigue (tiredness)  · Sore throat  · Runny or stuffy nose  · Muscle and body aches  · Headache  · Vomiting and diarrhea (more common in children than adults)  COVID-19 has another symptom that also may occur:  · New loss of taste or smell  COVID-19 symptoms may appear from 2 to 14 days after infection. Flu symptoms usually appear 1 to 4 days after infection. Why should you get a flu shot during the COVID-19 pandemic? It's important to get your yearly flu vaccine. Both the flu and COVID-19 can be active at the same time. You can get sick with both infections at once. And having both may make you more sick than getting just one. The flu vaccine won't protect you from COVID-19. But it can help prevent the flu or reduce its symptoms. If fewer people get very ill with the flu, this will help free up medical resources that are needed for COVID-19 patients. Where can you learn more? Go to http://ahsan-paola.info/  Enter C123 in the search box to learn more about \"Learning About COVID-19 and Flu Symptoms. \"  Current as of: March 26, 2021               Content Version: 13.0  © 8289-6508 Healthwise, Incorporated.    Care instructions adapted under license by [employer] (which disclaims liability or warranty for this information). If you have questions about a medical condition or this instruction, always ask your healthcare professional. [address] any warranty or liability for your use of this information.

## 2021-12-10 NOTE — PROGRESS NOTES
I have reviewed discharge instructions with the patient. The patient verbalized understanding. Prescription sent to [facility]. Belongings reviewed and sent with patient. Left via w/c to private vehicle.

## 2021-12-10 NOTE — DISCHARGE SUMMARY
Discharge Summary       PATIENT ID: Jessie Martell  MRN: [id number]   YOB: 1970    DATE OF ADMISSION: 12/7/2021 11:58 PM    DATE OF DISCHARGE: 12/10/21   PRIMARY CARE PROVIDER: Rhonda Smith PA-C     ATTENDING PHYSICIAN: Heidi Soliz MD  DISCHARGING PROVIDER: Heidi Soliz MD    To contact this individual call [phone] and ask the  to page. If unavailable ask to be transferred the Adult Hospitalist Department. CONSULTATIONS: None    PROCEDURES/SURGERIES: * No surgery found *    ADMITTING DIAGNOSES & HOSPITAL COURSE:     Jessie Martell is a 46 y.o. female with past medical history of ADHD came to the hospital with a chief complaint of shortness of breath and low oxygen saturation.     Patient states that she was having symptoms of fever, chills body aches, shortness of breath, cough for about 12 days. Patient states that initially she tested negative but then was tested positive last week. Patient states that her  and children also had COVID-19. Patient states that she did not seen. She was seen at [facility] yesterday where she was prescribed doxycycline. She was told to monitor her oxygen saturations-while at home she noted that oxygen saturation was less than 90%, she came to the emergency room for further evaluation.     In the emergency room, she received Decadron. Chest x-ray showed right lower lobe infiltrate    Admission diagnoses  #Acute hypoxemic respiratory failure  #COVID-19 pneumonia  -Oxygen saturation on room air 89%, she was placed on 2 L oxygen.  -Start Decadron.  -She states that she has symptoms for 12 days now-probably out of window for benefit of remdesivir. Can consider based on further clinical course. -She qualifies for barcitunib as CRP less than 7, I have provided information for the patient-start the medication if patient agrees.  -Procalcitonin within normal limits.   Patient said she took 2 doses of doxycycline since yesterday.  -Lovenox per protocol       #Mild AST/ALT elevation due to COVID-19. Trend CMP daily     #Morbid obesity     #ADHD-patient states that she takes Concerta as needed. Hospital course  Patient was admitted and started on Decadron at some 6 mg daily. Patient was kept on nasal cannula oxygen during her stay. Patient did not qualify for remdesivir as her symptoms were too far out. And she was initially placed on doxycycline. Once protocol was normal which she was taken off of antibiotics. Patient did qualify for baricitinib find her oxygen status improved very quickly. Patient was weaned off of the oxygen and maintain saturations without any supplemental oxygen. I discussed with her the [facility] and that they would contact her to see if she needed oxygen monitoring, but she was given up to go home and finish her course of steroids. On day of discharge patient was stable and saturating well on room air though she did wear 1 L of oxygen at some point for comfort. DISCHARGE DIAGNOSES / PLAN:      1. Acute hypoxic respiratory failure. Resolved. 2. Covid 19 pneumonia. Resolved. 3. Mild AST/ALT elevation due to COVID-19. Improved. 4. Morbid obesity. Stable. 5. ADHD. Stable.        PENDING TEST RESULTS:   At the time of discharge the following test results are still pending: None    FOLLOW UP APPOINTMENTS:    Follow-up Information     Follow up With Specialties Details Why Contact Info    Cornelius Meyers PA-C Physician Assistant In 1 week Hospital follow up [address] [place] [address] [phone]             ADDITIONAL CARE RECOMMENDATIONS: None    DIET: Regular Diet    ACTIVITY: Activity as tolerated    WOUND CARE: None    EQUIPMENT needed: None      DISCHARGE MEDICATIONS:  Current Discharge Medication List      START taking these medications    Details   dexAMETHasone (Decadron) 6 mg tablet One tablet daily for 7 days  Qty: 7 Tablet, Refills: 0  Start date: 12/10/2021         CONTINUE these medications which have NOT CHANGED    Details   methocarbamol (ROBAXIN) 750 mg tablet Take 1 Tab by mouth three (3) times daily. Qty: 30 Tab, Refills: 0      methylphenidate ER 54 mg 24 hr tab Take 54 mg by mouth as needed. NOTIFY YOUR PHYSICIAN FOR ANY OF THE FOLLOWING:   Fever over 101 degrees for 24 hours. Chest pain, shortness of breath, fever, chills, nausea, vomiting, diarrhea, change in mentation, falling, weakness, bleeding. Severe pain or pain not relieved by medications. Or, any other signs or symptoms that you may have questions about. DISPOSITION:    Home With:   OT  PT  HH  RN       Long term SNF/Inpatient Rehab    Independent/assisted living    Hospice    Other:       PATIENT CONDITION AT DISCHARGE:     Functional status    Poor     Deconditioned    X Independent      Cognition   X  Lucid     Forgetful     Dementia      Catheters/lines (plus indication)    Mejia     PICC     PEG    X None      Code status   X  Full code     DNR      PHYSICAL EXAMINATION AT DISCHARGE:  Vitals reviewed  Constitutional:  No acute distress, cooperative, pleasant    ENT:  Oral mucosa moist, oropharynx benign. Resp:  CTA bilaterally. No wheezing/rhonchi/rales. No accessory muscle use   CV:  Regular rhythm, normal rate, no murmurs, gallops, rubs    GI:  Soft, non distended, non tender. normoactive bowel sounds, no hepatosplenomegaly     Musculoskeletal:  No edema, warm, 2+ pulses throughout    Neurologic:  Moves all extremities.   AAOx3, CN II-XII reviewed                               CHRONIC MEDICAL DIAGNOSES:  Problem List as of 12/10/2021 Never Reviewed          Codes Class Noted - Resolved    SCSOA-22 ICD-10-CM: U07.1  ICD-9-CM: 079.89  12/8/2021 - Present        RESOLVED: Respiratory failure with hypoxia [facility]) ICD-10-CM: J96.91  ICD-9-CM: 518.81  12/10/2021 - 12/10/2021              Greater than 30 minutes were spent with the patient on counseling and coordination of care    Signed:   Aislinn Weinstein MD  12/10/2021  1:58 PM

## 2021-12-13 ENCOUNTER — PATIENT OUTREACH (OUTPATIENT)
Dept: CASE MANAGEMENT | Age: 51
End: 2021-12-13

## 2021-12-14 NOTE — PROGRESS NOTES
Care Transitions Outreach Attempt    Call within 2 business days of discharge: Yes   Attempted to reach patient for transitions of care follow up. Unable to reach patient. Patient: Ella Poe Patient : 1970 MRN: [id number]    Last Discharge [address]       Complaint Diagnosis Description Type Department Provider    21 Positive For Covid-19 Pneumonia due to COVID-19 virus . .. ED to Hosp-Admission (Discharged) (ADMIT) Kehinde North MD; Henna Waters .. Was this an external facility discharge? No      Noted following upcoming appointments from discharge chart review:   [address] follow up appointment(s): No future appointments. Non-[facility] follow up appointment(s):  Unknown    CTN unable to reach patient as her VM is full. CTN left message on spouse VM asking for return call.     CTN will attempt to contact again next week--grace

## 2021-12-21 ENCOUNTER — PATIENT OUTREACH (OUTPATIENT)
Dept: CASE MANAGEMENT | Age: 51
End: 2021-12-21

## 2021-12-21 NOTE — PROGRESS NOTES
12/21/21  CTN unable to reach patient after multiple attempts by phone. CTN performed chart review, no evidence of readmission. --mkrw

## 2022-03-18 PROBLEM — U07.1 COVID-19: Status: ACTIVE | Noted: 2021-12-08

## 2023-05-11 RX ORDER — METHOCARBAMOL 750 MG/1
TABLET, FILM COATED ORAL 3 TIMES DAILY
COMMUNITY
Start: 2018-11-28

## 2023-05-11 RX ORDER — METHYLPHENIDATE HYDROCHLORIDE 54 MG/1
TABLET, EXTENDED RELEASE ORAL PRN
COMMUNITY

## 2023-05-11 RX ORDER — DEXAMETHASONE 6 MG/1
TABLET ORAL
COMMUNITY
Start: 2021-12-10